# Patient Record
Sex: FEMALE | Race: OTHER | NOT HISPANIC OR LATINO
[De-identification: names, ages, dates, MRNs, and addresses within clinical notes are randomized per-mention and may not be internally consistent; named-entity substitution may affect disease eponyms.]

---

## 2019-07-08 ENCOUNTER — TRANSCRIPTION ENCOUNTER (OUTPATIENT)
Age: 24
End: 2019-07-08

## 2019-07-08 ENCOUNTER — APPOINTMENT (OUTPATIENT)
Dept: ANTEPARTUM | Facility: HOSPITAL | Age: 24
End: 2019-07-08
Payer: MEDICAID

## 2019-07-08 ENCOUNTER — INPATIENT (INPATIENT)
Facility: HOSPITAL | Age: 24
LOS: 3 days | Discharge: HOME | End: 2019-07-12
Attending: OBSTETRICS & GYNECOLOGY | Admitting: OBSTETRICS & GYNECOLOGY
Payer: MEDICAID

## 2019-07-08 ENCOUNTER — ASOB RESULT (OUTPATIENT)
Age: 24
End: 2019-07-08

## 2019-07-08 VITALS
DIASTOLIC BLOOD PRESSURE: 71 MMHG | TEMPERATURE: 99 F | SYSTOLIC BLOOD PRESSURE: 122 MMHG | OXYGEN SATURATION: 100 % | RESPIRATION RATE: 17 BRPM | HEART RATE: 88 BPM

## 2019-07-08 DIAGNOSIS — O26.899 OTHER SPECIFIED PREGNANCY RELATED CONDITIONS, UNSPECIFIED TRIMESTER: ICD-10-CM

## 2019-07-08 DIAGNOSIS — Z3A.00 WEEKS OF GESTATION OF PREGNANCY NOT SPECIFIED: ICD-10-CM

## 2019-07-08 DIAGNOSIS — O42.90 PREMATURE RUPTURE OF MEMBRANES, UNSPECIFIED AS TO LENGTH OF TIME BETWEEN RUPTURE AND ONSET OF LABOR, UNSPECIFIED WEEKS OF GESTATION: ICD-10-CM

## 2019-07-08 PROBLEM — Z00.00 ENCOUNTER FOR PREVENTIVE HEALTH EXAMINATION: Status: ACTIVE | Noted: 2019-07-08

## 2019-07-08 LAB
BASOPHILS # BLD AUTO: 0.06 K/UL — SIGNIFICANT CHANGE UP (ref 0–0.2)
BASOPHILS NFR BLD AUTO: 0.5 % — SIGNIFICANT CHANGE UP (ref 0–2)
BLD GP AB SCN SERPL QL: NEGATIVE — SIGNIFICANT CHANGE UP
EOSINOPHIL # BLD AUTO: 0.04 K/UL — SIGNIFICANT CHANGE UP (ref 0–0.5)
EOSINOPHIL NFR BLD AUTO: 0.3 % — SIGNIFICANT CHANGE UP (ref 0–6)
HCT VFR BLD CALC: 38.1 % — SIGNIFICANT CHANGE UP (ref 34.5–45)
HGB BLD-MCNC: 12 G/DL — SIGNIFICANT CHANGE UP (ref 11.5–15.5)
IMM GRANULOCYTES NFR BLD AUTO: 1.5 % — SIGNIFICANT CHANGE UP (ref 0–1.5)
LYMPHOCYTES # BLD AUTO: 1.44 K/UL — SIGNIFICANT CHANGE UP (ref 1–3.3)
LYMPHOCYTES # BLD AUTO: 12.5 % — LOW (ref 13–44)
MCHC RBC-ENTMCNC: 25.4 PG — LOW (ref 27–34)
MCHC RBC-ENTMCNC: 31.5 % — LOW (ref 32–36)
MCV RBC AUTO: 80.5 FL — SIGNIFICANT CHANGE UP (ref 80–100)
MONOCYTES # BLD AUTO: 0.55 K/UL — SIGNIFICANT CHANGE UP (ref 0–0.9)
MONOCYTES NFR BLD AUTO: 4.8 % — SIGNIFICANT CHANGE UP (ref 2–14)
NEUTROPHILS # BLD AUTO: 9.25 K/UL — HIGH (ref 1.8–7.4)
NEUTROPHILS NFR BLD AUTO: 80.4 % — HIGH (ref 43–77)
NRBC # FLD: 0 K/UL — SIGNIFICANT CHANGE UP (ref 0–0)
PLATELET # BLD AUTO: 229 K/UL — SIGNIFICANT CHANGE UP (ref 150–400)
PMV BLD: 10.8 FL — SIGNIFICANT CHANGE UP (ref 7–13)
RBC # BLD: 4.73 M/UL — SIGNIFICANT CHANGE UP (ref 3.8–5.2)
RBC # FLD: 14.8 % — HIGH (ref 10.3–14.5)
RH IG SCN BLD-IMP: POSITIVE — SIGNIFICANT CHANGE UP
RH IG SCN BLD-IMP: POSITIVE — SIGNIFICANT CHANGE UP
WBC # BLD: 11.51 K/UL — HIGH (ref 3.8–10.5)
WBC # FLD AUTO: 11.51 K/UL — HIGH (ref 3.8–10.5)

## 2019-07-08 PROCEDURE — 76819 FETAL BIOPHYS PROFIL W/O NST: CPT | Mod: 26

## 2019-07-08 PROCEDURE — 76805 OB US >/= 14 WKS SNGL FETUS: CPT | Mod: 26

## 2019-07-08 RX ORDER — BUTORPHANOL TARTRATE 2 MG/ML
2 INJECTION, SOLUTION INTRAMUSCULAR; INTRAVENOUS ONCE
Refills: 0 | Status: DISCONTINUED | OUTPATIENT
Start: 2019-07-08 | End: 2019-07-08

## 2019-07-08 RX ORDER — OXYTOCIN 10 UNIT/ML
333.33 VIAL (ML) INJECTION
Qty: 20 | Refills: 0 | Status: DISCONTINUED | OUTPATIENT
Start: 2019-07-08 | End: 2019-07-09

## 2019-07-08 RX ORDER — SODIUM CHLORIDE 9 MG/ML
1000 INJECTION, SOLUTION INTRAVENOUS
Refills: 0 | Status: DISCONTINUED | OUTPATIENT
Start: 2019-07-08 | End: 2019-07-09

## 2019-07-08 RX ORDER — OXYTOCIN 10 UNIT/ML
333.33 VIAL (ML) INJECTION
Qty: 20 | Refills: 0 | Status: DISCONTINUED | OUTPATIENT
Start: 2019-07-08 | End: 2019-07-08

## 2019-07-08 RX ORDER — SODIUM CHLORIDE 9 MG/ML
1000 INJECTION, SOLUTION INTRAVENOUS
Refills: 0 | Status: DISCONTINUED | OUTPATIENT
Start: 2019-07-08 | End: 2019-07-08

## 2019-07-08 RX ADMIN — BUTORPHANOL TARTRATE 2 MILLIGRAM(S): 2 INJECTION, SOLUTION INTRAMUSCULAR; INTRAVENOUS at 23:25

## 2019-07-08 RX ADMIN — BUTORPHANOL TARTRATE 2 MILLIGRAM(S): 2 INJECTION, SOLUTION INTRAMUSCULAR; INTRAVENOUS at 22:57

## 2019-07-08 NOTE — OB PROVIDER TRIAGE NOTE - HISTORY OF PRESENT ILLNESS
24 year old female   at 40weeks gestation who presents with LOF at 11am and mild contrxs and initially noted decreased fm but now feels more fm while in triage    deniied any ap issues denied any fetal issues      med hx denied   Surghx denied   NKDA   OB hx P0 SAB x1 24 year old female   at 40weeks gestation who presents with LOF at 11am and mild contrxs and initially noted decreased fm but now feels more fm while in triage    deniied any ap issues denied any fetal issues      med hx denied   Surghx denied   NKDA    OB hx P0 SAB x1

## 2019-07-08 NOTE — CHART NOTE - NSCHARTNOTEFT_GEN_A_CORE
Ob  note    Pt uncomfortable with contractions.      VE: 2.5/70/-3   moderate variability, +15x15 accels, -decels  [place] 3-6 mins      PROM continue PO cytotec  Discussed pain management pt desires IV pain meds at this time.  Stadol ordered.  Pt may have epidural when she desires.    Kasey Nur MD

## 2019-07-08 NOTE — OB PROVIDER TRIAGE NOTE - NSOBPROVIDERNOTE_OBGYN_ALL_OB_FT
24 year old female P0 at 40 wks gestation 24 year old female P0 at 40 wks gestation with SROM 11a for IOL with  PO Cytotec    GBS negative      case d/w Dr Ring 24 year old female P0 at 40 wks gestation with SROM 11a for IOL with  PO Cytotec    GBS unknown     case d/w Dr Ring

## 2019-07-08 NOTE — OB PROVIDER H&P - ASSESSMENT
24 year old female P0 at 40 wks who presented with SROM 11a   admitted for IOL with po Cytotec    GBS  negative     case d/w Dr Ring

## 2019-07-08 NOTE — OB RN TRIAGE NOTE - NS_VISITREASON1_OBGYN_ALL_OB
Reduced Fetal Movement/Rupture of Membranes Labor at Term/Reduced Fetal Movement/Rupture of Membranes

## 2019-07-08 NOTE — OB PROVIDER TRIAGE NOTE - NSHPPHYSICALEXAM_GEN_ALL_CORE
pt seen and examined    abd soft    SSE/VE        ABD SCAN  ATU pt seen and examined    abd soft    SSE/VE        ABD SCAN  ATU (sonographer)vtx kelli 8 bpp 8/8

## 2019-07-08 NOTE — OB PROVIDER H&P - HISTORY OF PRESENT ILLNESS
24 year old female   at 40weeks gestation who presents with LOF at 11am and mild contrxs and initially noted decreased fm but now feels more fm while in triage    deniied any ap issues denied any fetal issues      med hx denied   Surghx denied   NKDA    OB hx P0 SAB x1

## 2019-07-09 ENCOUNTER — RESULT REVIEW (OUTPATIENT)
Age: 24
End: 2019-07-09

## 2019-07-09 ENCOUNTER — TRANSCRIPTION ENCOUNTER (OUTPATIENT)
Age: 24
End: 2019-07-09

## 2019-07-09 LAB — T PALLIDUM AB TITR SER: NEGATIVE — SIGNIFICANT CHANGE UP

## 2019-07-09 PROCEDURE — 88307 TISSUE EXAM BY PATHOLOGIST: CPT | Mod: 26

## 2019-07-09 RX ORDER — DIPHENHYDRAMINE HCL 50 MG
25 CAPSULE ORAL EVERY 6 HOURS
Refills: 0 | Status: DISCONTINUED | OUTPATIENT
Start: 2019-07-09 | End: 2019-07-12

## 2019-07-09 RX ORDER — OXYCODONE HYDROCHLORIDE 5 MG/1
5 TABLET ORAL
Refills: 0 | Status: COMPLETED | OUTPATIENT
Start: 2019-07-09 | End: 2019-07-16

## 2019-07-09 RX ORDER — OXYTOCIN 10 UNIT/ML
333.33 VIAL (ML) INJECTION
Qty: 20 | Refills: 0 | Status: DISCONTINUED | OUTPATIENT
Start: 2019-07-09 | End: 2019-07-09

## 2019-07-09 RX ORDER — SODIUM CHLORIDE 9 MG/ML
1000 INJECTION, SOLUTION INTRAVENOUS
Refills: 0 | Status: DISCONTINUED | OUTPATIENT
Start: 2019-07-09 | End: 2019-07-09

## 2019-07-09 RX ORDER — LANOLIN
1 OINTMENT (GRAM) TOPICAL EVERY 6 HOURS
Refills: 0 | Status: DISCONTINUED | OUTPATIENT
Start: 2019-07-09 | End: 2019-07-12

## 2019-07-09 RX ORDER — CITRIC ACID/SODIUM CITRATE 300-500 MG
30 SOLUTION, ORAL ORAL ONCE
Refills: 0 | Status: DISCONTINUED | OUTPATIENT
Start: 2019-07-09 | End: 2019-07-09

## 2019-07-09 RX ORDER — TETANUS TOXOID, REDUCED DIPHTHERIA TOXOID AND ACELLULAR PERTUSSIS VACCINE, ADSORBED 5; 2.5; 8; 8; 2.5 [IU]/.5ML; [IU]/.5ML; UG/.5ML; UG/.5ML; UG/.5ML
0.5 SUSPENSION INTRAMUSCULAR ONCE
Refills: 0 | Status: DISCONTINUED | OUTPATIENT
Start: 2019-07-09 | End: 2019-07-12

## 2019-07-09 RX ORDER — GLYCERIN ADULT
1 SUPPOSITORY, RECTAL RECTAL AT BEDTIME
Refills: 0 | Status: DISCONTINUED | OUTPATIENT
Start: 2019-07-09 | End: 2019-07-12

## 2019-07-09 RX ORDER — NALOXONE HYDROCHLORIDE 4 MG/.1ML
0.1 SPRAY NASAL
Refills: 0 | Status: DISCONTINUED | OUTPATIENT
Start: 2019-07-09 | End: 2019-07-11

## 2019-07-09 RX ORDER — ONDANSETRON 8 MG/1
4 TABLET, FILM COATED ORAL EVERY 6 HOURS
Refills: 0 | Status: DISCONTINUED | OUTPATIENT
Start: 2019-07-09 | End: 2019-07-11

## 2019-07-09 RX ORDER — FAMOTIDINE 10 MG/ML
20 INJECTION INTRAVENOUS ONCE
Refills: 0 | Status: DISCONTINUED | OUTPATIENT
Start: 2019-07-09 | End: 2019-07-09

## 2019-07-09 RX ORDER — HEPARIN SODIUM 5000 [USP'U]/ML
5000 INJECTION INTRAVENOUS; SUBCUTANEOUS EVERY 12 HOURS
Refills: 0 | Status: DISCONTINUED | OUTPATIENT
Start: 2019-07-09 | End: 2019-07-12

## 2019-07-09 RX ORDER — SIMETHICONE 80 MG/1
80 TABLET, CHEWABLE ORAL EVERY 4 HOURS
Refills: 0 | Status: DISCONTINUED | OUTPATIENT
Start: 2019-07-09 | End: 2019-07-12

## 2019-07-09 RX ORDER — KETOROLAC TROMETHAMINE 30 MG/ML
30 SYRINGE (ML) INJECTION EVERY 6 HOURS
Refills: 0 | Status: DISCONTINUED | OUTPATIENT
Start: 2019-07-09 | End: 2019-07-11

## 2019-07-09 RX ORDER — IBUPROFEN 200 MG
600 TABLET ORAL EVERY 6 HOURS
Refills: 0 | Status: COMPLETED | OUTPATIENT
Start: 2019-07-09 | End: 2020-06-06

## 2019-07-09 RX ORDER — MAGNESIUM HYDROXIDE 400 MG/1
30 TABLET, CHEWABLE ORAL
Refills: 0 | Status: DISCONTINUED | OUTPATIENT
Start: 2019-07-09 | End: 2019-07-12

## 2019-07-09 RX ORDER — DOCUSATE SODIUM 100 MG
100 CAPSULE ORAL
Refills: 0 | Status: DISCONTINUED | OUTPATIENT
Start: 2019-07-09 | End: 2019-07-12

## 2019-07-09 RX ORDER — DEXAMETHASONE 0.5 MG/5ML
4 ELIXIR ORAL EVERY 6 HOURS
Refills: 0 | Status: DISCONTINUED | OUTPATIENT
Start: 2019-07-09 | End: 2019-07-11

## 2019-07-09 RX ORDER — OXYCODONE HYDROCHLORIDE 5 MG/1
5 TABLET ORAL ONCE
Refills: 0 | Status: DISCONTINUED | OUTPATIENT
Start: 2019-07-09 | End: 2019-07-12

## 2019-07-09 RX ORDER — ACETAMINOPHEN 500 MG
975 TABLET ORAL
Refills: 0 | Status: DISCONTINUED | OUTPATIENT
Start: 2019-07-09 | End: 2019-07-12

## 2019-07-09 RX ORDER — METOCLOPRAMIDE HCL 10 MG
10 TABLET ORAL ONCE
Refills: 0 | Status: DISCONTINUED | OUTPATIENT
Start: 2019-07-09 | End: 2019-07-09

## 2019-07-09 RX ORDER — ONDANSETRON 8 MG/1
4 TABLET, FILM COATED ORAL ONCE
Refills: 0 | Status: DISCONTINUED | OUTPATIENT
Start: 2019-07-09 | End: 2019-07-09

## 2019-07-09 RX ADMIN — SIMETHICONE 80 MILLIGRAM(S): 80 TABLET, CHEWABLE ORAL at 18:35

## 2019-07-09 RX ADMIN — Medication 30 MILLIGRAM(S): at 18:55

## 2019-07-09 RX ADMIN — SIMETHICONE 80 MILLIGRAM(S): 80 TABLET, CHEWABLE ORAL at 13:15

## 2019-07-09 RX ADMIN — Medication 30 MILLIGRAM(S): at 08:49

## 2019-07-09 RX ADMIN — Medication 975 MILLIGRAM(S): at 21:21

## 2019-07-09 RX ADMIN — SODIUM CHLORIDE 75 MILLILITER(S): 9 INJECTION, SOLUTION INTRAVENOUS at 08:06

## 2019-07-09 RX ADMIN — Medication 30 MILLIGRAM(S): at 18:35

## 2019-07-09 RX ADMIN — Medication 30 MILLIGRAM(S): at 08:05

## 2019-07-09 RX ADMIN — Medication 1000 MILLIUNIT(S)/MIN: at 08:06

## 2019-07-09 RX ADMIN — Medication 975 MILLIGRAM(S): at 13:15

## 2019-07-09 RX ADMIN — Medication 975 MILLIGRAM(S): at 13:50

## 2019-07-09 RX ADMIN — Medication 100 MILLIGRAM(S): at 13:15

## 2019-07-09 RX ADMIN — Medication 975 MILLIGRAM(S): at 21:35

## 2019-07-09 RX ADMIN — HEPARIN SODIUM 5000 UNIT(S): 5000 INJECTION INTRAVENOUS; SUBCUTANEOUS at 13:15

## 2019-07-09 NOTE — CHART NOTE - NSCHARTNOTEFT_GEN_A_CORE
R1 OB Progress Note    Patient seen and evaluated at bedside.  Endorses increasing discomfort with contractions.     T(C): 36.7 (19 @ 22:36), Max: 37.3 (19 @ 13:15)  HR: 88 (19 @ 00:59) (62 - 110)  BP: 100/61 (19 @ 22:36) (100/59 - 122/71)  RR: 18 (19 @ 22:30) (17 - 18)  SpO2: 100% (19 @ 00:59) (96% - 100%)    SVE: 2/70/-3    EFM: 120, moderate variability, + accels, + intermittent late decels, no recurrent decels   [place]: cont q2-3 min    A/P 24y  at 40w admitted for IOL 2/2 PROM     -Labor: continue PO cytotec.  -Fetus: Cat II tracing. Continue resuscitation with positioning and supplementary O2.   -GBS negative  -Analgesia: Discussed pain management, patient is not yet interested in an epidural although per Dr. Nur is able to have epidural when she wishes.     Iliana Tang, PGY-1  d/w Dr. Nur

## 2019-07-09 NOTE — CHART NOTE - NSCHARTNOTEFT_GEN_A_CORE
Ob  note    Went to evaluate pt for decelerations.  Pt comfortable after epidural.   VE 5/90/-3  AROM clear     moderate variability, -accels, recurrent late decels  [place] ctxs q 2-4 mins      Despite interventions, IVF bolus, O2 administration, repositioning, FHT with recurrent decels at 5 cm.  Showed and reviewed fetal heart tracing to  and patient, expressed concern due to recurrent decelerations.  Recommended PLTCS at this time.  Discussed risks and benefits of c/s with pt.  Stepped out of the room to give family time to discuss.    Kasey Nur MD

## 2019-07-09 NOTE — OB NEONATOLOGY/PEDIATRICIAN DELIVERY SUMMARY - NSPEDSNEONOTESA_OBGYN_ALL_OB_FT
Baby is a 40.1 week GA male born to a 23 y/o G 2  mother via c/s for cat 2 tracing. No significant Maternal or Pregnancy hx. Maternal blood type A+. Prenatal labs neg/NR/imm. GBS neg on 6/15. ROM 18hrs with clear fluid. Baby born vigorous and crying spontaneously. Warmed, dried, stimulated. Apgars 9 / 9. EOS 0.11. Mom desires hep B, circ, breast feeding.

## 2019-07-09 NOTE — CHART NOTE - NSCHARTNOTEFT_GEN_A_CORE
R3 labor Note    Pt evaluated for low baseline and recurrent decels    SVE: 3.4/80/-2  EFM: 110/mod hayden/+accel/recurrent late decels  [place]: q2-3 min    Vital Signs Last 24 Hrs  T(C): 36.7 (09 Jul 2019 01:53), Max: 37.3 (08 Jul 2019 13:15)  T(F): 98.1 (09 Jul 2019 01:53), Max: 99.1 (08 Jul 2019 13:15)  HR: 103 (09 Jul 2019 02:39) (62 - 122)  BP: 128/74 (09 Jul 2019 01:53) (100/59 - 128/74)  BP(mean): --  RR: 18 (09 Jul 2019 01:53) (17 - 18)  SpO2: 99% (09 Jul 2019 02:39) (96% - 100%)    I&O's Detail    08 Jul 2019 07:01  -  09 Jul 2019 02:52  --------------------------------------------------------  IN:    lactated ringers.: 375 mL  Total IN: 375 mL    OUT:  Total OUT: 0 mL    Total NET: 375 mL                                12.0   11.51 )-----------( 229      ( 08 Jul 2019 16:08 )             38.1       Plan  Cat 2 tracing resuscitate with lateral tilt O2 IVF  Transfer to LDR. Continue IOL with pitocin  Pt will consider epidural    Pt seen and d/w Dr. Boom Delarosa PGY3

## 2019-07-09 NOTE — BRIEF OPERATIVE NOTE - NSICDXBRIEFPOSTOP_GEN_ALL_CORE_FT
POST-OP DIAGNOSIS:  Non-reassuring electronic fetal monitoring tracing 09-Jul-2019 07:26:11  Isa España

## 2019-07-09 NOTE — DISCHARGE NOTE OB - MEDICATION SUMMARY - MEDICATIONS TO TAKE
I will START or STAY ON the medications listed below when I get home from the hospital:    acetaminophen 325 mg oral tablet  -- 3 tab(s) by mouth   -- Indication: For Pain, as needed    ibuprofen 600 mg oral tablet  -- 1 tab(s) by mouth every 6 hours  -- Indication: For Pain, as needed

## 2019-07-09 NOTE — OB PROVIDER DELIVERY SUMMARY - NSPROVIDERDELIVERYNOTE_OBGYN_ALL_OB_FT
Uncomplicated pLTCS for NRFHT remote from delivery     Viable infant Uncomplicated pLTCS for NRFHT remote from delivery     Viable infant    Kasey Nur MD

## 2019-07-09 NOTE — DISCHARGE NOTE OB - PATIENT PORTAL LINK FT
You can access the [facility] Portal, offered by [facility], by registering with the following website: [email or web address]

## 2019-07-09 NOTE — BRIEF OPERATIVE NOTE - NSICDXBRIEFPREOP_GEN_ALL_CORE_FT
PRE-OP DIAGNOSIS:  Non-reassuring electronic fetal monitoring tracing 09-Jul-2019 07:25:47  Isa España

## 2019-07-09 NOTE — DISCHARGE NOTE OB - CARE PROVIDER_API CALL
Pati Beltran)  Obstetrics and Gynecology  [address], [address]  [place] [address]  Phone: [phone]  Fax: [phone]  Follow Up Time:

## 2019-07-10 LAB
BASOPHILS # BLD AUTO: 0.05 K/UL — SIGNIFICANT CHANGE UP (ref 0–0.2)
BASOPHILS NFR BLD AUTO: 0.3 % — SIGNIFICANT CHANGE UP (ref 0–2)
EOSINOPHIL # BLD AUTO: 0.03 K/UL — SIGNIFICANT CHANGE UP (ref 0–0.5)
EOSINOPHIL NFR BLD AUTO: 0.2 % — SIGNIFICANT CHANGE UP (ref 0–6)
HCT VFR BLD CALC: 31.9 % — LOW (ref 34.5–45)
HGB BLD-MCNC: 9.7 G/DL — LOW (ref 11.5–15.5)
IMM GRANULOCYTES NFR BLD AUTO: 1.1 % — SIGNIFICANT CHANGE UP (ref 0–1.5)
LYMPHOCYTES # BLD AUTO: 15.9 % — SIGNIFICANT CHANGE UP (ref 13–44)
LYMPHOCYTES # BLD AUTO: 2.53 K/UL — SIGNIFICANT CHANGE UP (ref 1–3.3)
MCHC RBC-ENTMCNC: 24.9 PG — LOW (ref 27–34)
MCHC RBC-ENTMCNC: 30.4 % — LOW (ref 32–36)
MCV RBC AUTO: 82 FL — SIGNIFICANT CHANGE UP (ref 80–100)
MONOCYTES # BLD AUTO: 1.21 K/UL — HIGH (ref 0–0.9)
MONOCYTES NFR BLD AUTO: 7.6 % — SIGNIFICANT CHANGE UP (ref 2–14)
NEUTROPHILS # BLD AUTO: 11.96 K/UL — HIGH (ref 1.8–7.4)
NEUTROPHILS NFR BLD AUTO: 74.9 % — SIGNIFICANT CHANGE UP (ref 43–77)
NRBC # FLD: 0 K/UL — SIGNIFICANT CHANGE UP (ref 0–0)
PLATELET # BLD AUTO: 203 K/UL — SIGNIFICANT CHANGE UP (ref 150–400)
PMV BLD: 11.1 FL — SIGNIFICANT CHANGE UP (ref 7–13)
RBC # BLD: 3.89 M/UL — SIGNIFICANT CHANGE UP (ref 3.8–5.2)
RBC # FLD: 15 % — HIGH (ref 10.3–14.5)
WBC # BLD: 15.96 K/UL — HIGH (ref 3.8–10.5)
WBC # FLD AUTO: 15.96 K/UL — HIGH (ref 3.8–10.5)

## 2019-07-10 RX ORDER — OXYCODONE HYDROCHLORIDE 5 MG/1
5 TABLET ORAL ONCE
Refills: 0 | Status: DISCONTINUED | OUTPATIENT
Start: 2019-07-10 | End: 2019-07-12

## 2019-07-10 RX ORDER — FERROUS SULFATE 325(65) MG
325 TABLET ORAL DAILY
Refills: 0 | Status: DISCONTINUED | OUTPATIENT
Start: 2019-07-10 | End: 2019-07-12

## 2019-07-10 RX ORDER — IBUPROFEN 200 MG
600 TABLET ORAL EVERY 6 HOURS
Refills: 0 | Status: DISCONTINUED | OUTPATIENT
Start: 2019-07-10 | End: 2019-07-12

## 2019-07-10 RX ORDER — OXYCODONE HYDROCHLORIDE 5 MG/1
5 TABLET ORAL
Refills: 0 | Status: DISCONTINUED | OUTPATIENT
Start: 2019-07-10 | End: 2019-07-12

## 2019-07-10 RX ADMIN — Medication 975 MILLIGRAM(S): at 12:10

## 2019-07-10 RX ADMIN — Medication 975 MILLIGRAM(S): at 11:40

## 2019-07-10 RX ADMIN — Medication 600 MILLIGRAM(S): at 16:30

## 2019-07-10 RX ADMIN — Medication 600 MILLIGRAM(S): at 23:06

## 2019-07-10 RX ADMIN — Medication 600 MILLIGRAM(S): at 17:00

## 2019-07-10 RX ADMIN — Medication 975 MILLIGRAM(S): at 18:55

## 2019-07-10 RX ADMIN — Medication 30 MILLIGRAM(S): at 00:22

## 2019-07-10 RX ADMIN — SIMETHICONE 80 MILLIGRAM(S): 80 TABLET, CHEWABLE ORAL at 11:40

## 2019-07-10 RX ADMIN — SIMETHICONE 80 MILLIGRAM(S): 80 TABLET, CHEWABLE ORAL at 18:21

## 2019-07-10 RX ADMIN — HEPARIN SODIUM 5000 UNIT(S): 5000 INJECTION INTRAVENOUS; SUBCUTANEOUS at 00:21

## 2019-07-10 RX ADMIN — Medication 600 MILLIGRAM(S): at 23:35

## 2019-07-10 RX ADMIN — Medication 325 MILLIGRAM(S): at 16:25

## 2019-07-10 RX ADMIN — Medication 975 MILLIGRAM(S): at 18:21

## 2019-07-10 RX ADMIN — HEPARIN SODIUM 5000 UNIT(S): 5000 INJECTION INTRAVENOUS; SUBCUTANEOUS at 14:20

## 2019-07-10 RX ADMIN — Medication 100 MILLIGRAM(S): at 11:40

## 2019-07-10 RX ADMIN — Medication 30 MILLIGRAM(S): at 00:46

## 2019-07-10 RX ADMIN — Medication 30 MILLIGRAM(S): at 06:26

## 2019-07-10 RX ADMIN — Medication 30 MILLIGRAM(S): at 07:06

## 2019-07-10 RX ADMIN — Medication 1 TABLET(S): at 16:25

## 2019-07-10 NOTE — PROVIDER CONTACT NOTE (OTHER) - ASSESSMENT
c/o headache 6/10, no blurry vision or dizziness. Verbalized she gets headache when she sits down and feels fine when she's laying down. Lochia light, fundus at umbilicus. No s/sx of infection, bleeding or drainage from the incision site.

## 2019-07-10 NOTE — PROGRESS NOTE ADULT - PROBLEM SELECTOR PLAN 1
- Continue regular diet.  - Increase ambulation.  - Continue motrin, tylenol, oxycodone PRN for pain control.  - F/u AM CBC  -Milacron for gas Robyn Frankel PGY-1 - Continue regular diet.  - Increase ambulation.  - Continue motrin, tylenol, oxycodone PRN for pain control.  - F/u AM CBC  -Mylicon for gas Robyn Frankel PGY-1

## 2019-07-10 NOTE — PROGRESS NOTE ADULT - SUBJECTIVE AND OBJECTIVE BOX
POST OP DAY  1  s/p   SECTION    SUBJECTIVE:  I'm ok.    PAIN SCALE SCORE: [x] Refer to charted pain scores    THERAPY:  [  ] Spinal morphine   [ x ] Epidural morphine   [  ] IV PCA Hydromorphone 1 mg/ml    OBJECTIVE:  Comfortable Appearing    SEDATION SCORE:	  [ x ] Alert	    [  ] Drowsy        [  ] Arousable	[  ] Asleep	[  ] Unresponsive    Side Effects:	  [ x ] None	     [  ] Nausea        [  ] Pruritus        [  ] Weakness   [  ] Numbness        ASSESSMENT/ PLAN   [   ] Discontinue         [  ] Continue    [ x ] Change to prn Analgesics as per primary service.    DOCUMENTATION & VERIFICATION OF CURRENT MEDS [ x ] Done    COMMENTS: Patient complains of headache focused on the top of her head.  Headache is non-positional, started <24 hours after epidural placement.  No visual or auditory symptoms.  She was seen walking around the room.  Advised, PO hydration, moderate caffeine intake and po analgesics PRN.  Will follow.

## 2019-07-10 NOTE — PROVIDER CONTACT NOTE (OTHER) - BACKGROUND
Skyla.  on 19 at 05:22 to a baby boy, 40.1 weeks gestation, para 1011, ebl=700 No med. surg. history.

## 2019-07-10 NOTE — CHART NOTE - NSCHARTNOTEFT_GEN_A_CORE
Patient assessed at 1645 due to reporting persistent headache after receiving acetaminophen for pain this am. patient states headache started this am, denies any blur vision, dizziness, shortness of breath, difficulties breathing and/or any history of headaches. patient reports frontal headache with slight neck stiffness. Patient reports relief of headache when laying flat. Patient also reports gas discomfort in shoulder. Patient states she has been passing flatus and states she has been using incentive spirometer. patient reports that she there was a few attempts of spinal epidural when placed at delivery.     Vitals Signs  Vital Signs Last 24 Hrs  T(C): 36.7 (10 Jul 2019 14:54), Max: 36.8 (10 Jul 2019 11:35)  T(F): 98 (10 Jul 2019 14:54), Max: 98.2 (10 Jul 2019 11:35)  HR: 73 (10 Jul 2019 14:54) (73 - 82)  BP: 96/74 (10 Jul 2019 14:54) (95/54 - 111/66)  BP(mean): --  RR: 17 (10 Jul 2019 14:54) (17 - 18)  SpO2: 99% (10 Jul 2019 14:54) (99% - 100%)    Labs                        9.7    15.96 )-----------( 203      ( 10 Jul 2019 06:20 )             31.9       Assessment  25y/o  1day @ 0522 postpartum primary  section for abnormal fetal status. History of SABx1. EBL 700cc. Alert and orientedx3. No distress noted. Lung sounds clear bilaterally. Abdomen soft, nontender, slight distended tympanic abdomen. Abdominal incision clean, dry and intact with steri strips. Abdominal binder in place. Lochia light rubra. Slight edema to lower extremities equal bilaterally nontender with no erythema noted and positive pedal pulses.     Plan:  Anesthesia consulted to rule out possible spinal headache.   Patient instructed to lay flat when at rest, increase hydration, use of pain medication and caffeine to assist in relief of spinal headache.   Will continue to monitor.

## 2019-07-10 NOTE — PROGRESS NOTE ADULT - ASSESSMENT
A/P: 25yo POD#1 s/p LTCS.  Patient is stable and doing well post-operatively.  VS are stable and wnl. Chest pain likely 2/2 to decreased mobility and gas.

## 2019-07-10 NOTE — PROGRESS NOTE ADULT - SUBJECTIVE AND OBJECTIVE BOX
OB Progress Note:  Delivery, POD#1    S: 25yo POD#1 s/p LTCS . C/o mild chest pain and headache this AM.  She is tolerating a regular diet has not yet passed flatus. Denies N/V. Denies CP/SOB/lightheadedness/dizziness.   She is ambulating without difficulty.   Voiding spontaneously  Denies heavy vaginal bleeding.    O:   Vital Signs Last 24 Hrs  T(C): 36.7 (10 Jul 2019 06:15), Max: 37.6 (2019 08:15)  T(F): 98.1 (10 Jul 2019 06:15), Max: 99.7 (2019 08:15)  HR: 82 (10 Jul 2019 06:15) (78 - 94)  BP: 111/66 (10 Jul 2019 06:15) (95/54 - 128/67)  BP(mean): 79 (2019 08:15) (79 - 79)  RR: 18 (10 Jul 2019 06:15) (18 - 20)  SpO2: 100% (10 Jul 2019 06:15) (98% - 100%)    Labs:  Blood type: A Positive  Rubella IgG: RPR: Negative                          9.7<L>   15.96<H> >-----------< 203    ( 07-10 @ 06:20 )             31.9<L>                        12.0   11.51<H> >-----------< 229    (  @ 16:08 )             38.1                  PE:  General: NAD  CV: RRR   Chest: CTABL  Abdomen: Mildly distended, appropriately tender, Fundus firm, incision c/d/i.  VE: No heavy vaginal bleeding  Extremities: No erythema, no pitting edema

## 2019-07-10 NOTE — PROGRESS NOTE ADULT - SUBJECTIVE AND OBJECTIVE BOX
ANESTHESIA POSTOP CHECK    24y Female POSTOP DAY 1     Patient had questionable wet tap.  Evaluated for PDPH.  Patient seen walking around the room complaining of a headache on the top of her head.  Headache started less than 24 hours post epidural, is non positional and has no associated bulbar symptoms.  PO hydration, caffeine intake, and analgesic use has been encouraged.  Will re-evaluate for headache on 7/11/19 as well.    Please call with any concerns x [phone]

## 2019-07-10 NOTE — PROGRESS NOTE ADULT - SUBJECTIVE AND OBJECTIVE BOX
Called by NP to evaluate patient for post dural puncture headache.  Patient with history of questionable dural puncture as per verbal report from Dr. Garcia and from chart.  Patients symptoms remain unchanged from this morning except patient now reports relief from lying flat.  She has not received tylenol this afternoon.  Conservative measures this morning have been taken into consideration.  Additional medication offered to relieve headache oxycodone and neurontin offered but at this time the patient has refused any additional medication.  A blood patch was briefly discussed but as this patient has, if it is indeed a post dural puncture headache it is mild at this time as the patient is able to sit upright on a couch during the entirety of this evaluation.  OB Anesthesia night team is aware of this patient and we will follow up again tomorrow.

## 2019-07-11 RX ORDER — IBUPROFEN 200 MG
1 TABLET ORAL
Qty: 0 | Refills: 0 | DISCHARGE
Start: 2019-07-11

## 2019-07-11 RX ORDER — CAFFEINE 200 MG
200 TABLET ORAL EVERY 6 HOURS
Refills: 0 | Status: DISCONTINUED | OUTPATIENT
Start: 2019-07-11 | End: 2019-07-11

## 2019-07-11 RX ORDER — GABAPENTIN 400 MG/1
100 CAPSULE ORAL THREE TIMES A DAY
Refills: 0 | Status: DISCONTINUED | OUTPATIENT
Start: 2019-07-11 | End: 2019-07-12

## 2019-07-11 RX ORDER — ACETAMINOPHEN 500 MG
3 TABLET ORAL
Qty: 0 | Refills: 0 | DISCHARGE
Start: 2019-07-11

## 2019-07-11 RX ADMIN — OXYCODONE HYDROCHLORIDE 5 MILLIGRAM(S): 5 TABLET ORAL at 11:30

## 2019-07-11 RX ADMIN — Medication 600 MILLIGRAM(S): at 12:36

## 2019-07-11 RX ADMIN — Medication 975 MILLIGRAM(S): at 22:50

## 2019-07-11 RX ADMIN — SIMETHICONE 80 MILLIGRAM(S): 80 TABLET, CHEWABLE ORAL at 05:29

## 2019-07-11 RX ADMIN — Medication 975 MILLIGRAM(S): at 22:20

## 2019-07-11 RX ADMIN — GABAPENTIN 100 MILLIGRAM(S): 400 CAPSULE ORAL at 22:21

## 2019-07-11 RX ADMIN — Medication 100 MILLIGRAM(S): at 05:29

## 2019-07-11 RX ADMIN — Medication 600 MILLIGRAM(S): at 17:52

## 2019-07-11 RX ADMIN — Medication 975 MILLIGRAM(S): at 09:10

## 2019-07-11 RX ADMIN — OXYCODONE HYDROCHLORIDE 5 MILLIGRAM(S): 5 TABLET ORAL at 03:00

## 2019-07-11 RX ADMIN — Medication 975 MILLIGRAM(S): at 10:00

## 2019-07-11 RX ADMIN — HEPARIN SODIUM 5000 UNIT(S): 5000 INJECTION INTRAVENOUS; SUBCUTANEOUS at 14:08

## 2019-07-11 RX ADMIN — OXYCODONE HYDROCHLORIDE 5 MILLIGRAM(S): 5 TABLET ORAL at 02:33

## 2019-07-11 RX ADMIN — Medication 975 MILLIGRAM(S): at 03:00

## 2019-07-11 RX ADMIN — Medication 600 MILLIGRAM(S): at 13:35

## 2019-07-11 RX ADMIN — OXYCODONE HYDROCHLORIDE 5 MILLIGRAM(S): 5 TABLET ORAL at 10:33

## 2019-07-11 RX ADMIN — Medication 1 TABLET(S): at 12:36

## 2019-07-11 RX ADMIN — Medication 100 MILLIGRAM(S): at 17:52

## 2019-07-11 RX ADMIN — GABAPENTIN 100 MILLIGRAM(S): 400 CAPSULE ORAL at 14:08

## 2019-07-11 RX ADMIN — Medication 975 MILLIGRAM(S): at 02:33

## 2019-07-11 RX ADMIN — Medication 600 MILLIGRAM(S): at 06:00

## 2019-07-11 RX ADMIN — Medication 600 MILLIGRAM(S): at 05:29

## 2019-07-11 RX ADMIN — HEPARIN SODIUM 5000 UNIT(S): 5000 INJECTION INTRAVENOUS; SUBCUTANEOUS at 02:33

## 2019-07-11 RX ADMIN — Medication 600 MILLIGRAM(S): at 18:52

## 2019-07-11 NOTE — PROGRESS NOTE ADULT - SUBJECTIVE AND OBJECTIVE BOX
SUBJECTIVE:    Pain: well controlled  Complaints:none    MILESTONES:    Alert and oriented x 3  [x  ]  Out of bed/ ambulating. [x  ]  Flatus: [x  ]  Postive [  ] Negative   Bowel movement  [  ] Positive [ x ] Negative   Voiding [x  ] Due to void [  ]   Diet: Regular [ x ]  Clears [  ]  NPO [  ]  Infant feeding:  Breast [ x ]   Bottle [  ]  Both [  ]  Feeding related inssues and/or concerns:none      OBJECTIVE:  T(C): 36.7 (19 @ 10:35), Max: 37.2 (07-10-19 @ 22:11)  HR: 88 (19 @ 10:35) (67 - 91)  BP: 100/69 (19 @ 10:35) (96/74 - 118/63)  RR: 20 (19 @ 10:35) (17 - 20)  SpO2: 99% (19 @ 10:35) (99% - 100%)  Wt(kg): --                        9.7    15.96 )-----------( 203      ( 10 Jul 2019 06:20 )             31.9     MEDICATIONS  (STANDING):  acetaminophen   Tablet .. 975 milliGRAM(s) Oral <User Schedule>  diphtheria/tetanus/pertussis (acellular) Vaccine (ADAcel) 0.5 milliLiter(s) IntraMuscular once  ferrous    sulfate 325 milliGRAM(s) Oral daily  gabapentin 100 milliGRAM(s) Oral three times a day  heparin  Injectable 5000 Unit(s) SubCutaneous every 12 hours  ibuprofen  Tablet. 600 milliGRAM(s) Oral every 6 hours  prenatal multivitamin 1 Tablet(s) Oral daily    MEDICATIONS  (PRN):  dexamethasone  Injectable 4 milliGRAM(s) IV Push every 6 hours PRN Nausea  diphenhydrAMINE 25 milliGRAM(s) Oral every 6 hours PRN Itching  docusate sodium 100 milliGRAM(s) Oral two times a day PRN Stool softening  glycerin Suppository - Adult 1 Suppository(s) Rectal at bedtime PRN Constipation  lanolin Ointment 1 Application(s) Topical every 6 hours PRN Sore Nipples  magnesium hydroxide Suspension 30 milliLiter(s) Oral two times a day PRN Constipation  naloxone Injectable 0.1 milliGRAM(s) IV Push every 3 minutes PRN For ANY of the following changes in patient status:  A. Breaths Per Minute LESS THAN 10, B. Oxygen saturation LESS THAN 90%, C. Sedation score of 6 for Stop After: 4 Times  ondansetron Injectable 4 milliGRAM(s) IV Push every 6 hours PRN Nausea  oxyCODONE    IR 5 milliGRAM(s) Oral once PRN Moderate to Severe Pain (4-10)  oxyCODONE    IR 5 milliGRAM(s) Oral every 3 hours PRN Moderate to Severe Pain (4-10)  oxyCODONE    IR 5 milliGRAM(s) Oral once PRN Moderate to Severe Pain (4-10)  simethicone 80 milliGRAM(s) Chew every 4 hours PRN Gas    ASSESSMENT:  24y  y/o G 2  P   1 PO Day#  2      Delivery: Primary [  x]    Repeat [  ]                                       Indication of procedure: abnormal fetal status  Condition: Stable  Past Medical History significant for: HPI:  24 year old female   at 40weeks gestation who presents with LOF at 11am and mild contrxs and initially noted decreased fm but now feels more fm while in triage    deniied any ap issues denied any fetal issues      med hx denied   Surghx denied   NKDA    OB hx P0 SAB x1 (2019 15:19)    Current Issues: spinal HA  Heart:         RRR                     Lungs: clear  Breasts:  Soft [x  ]   Engorged [  ]  Abdomen: Soft [x  ] , distended [  ] nontender [  ]   Bowel sounds :  Present [x  ]  Absent [  ]   Fundus firm [x  ]  Boggy [  ]  Abdominal incision: Clean, dry and intact [x  ]  Staples [  ] Steri Strips [ x ] Dermabond [  ] Sutures [  ]  Patient wearing abdominal binder for support.  Vaginal: Lochia:  Heavy [  ]  Moderate [  ]   Scant [ x ]  Extremities: Edema [0  ] negative Kimberly's Sign [ x ] Nontender Jose  [ x ] Positive pedal pulses [ x ]  Other relevant physical exam findings: stiff neck and spinal HA feel better on supine position      PLAN:  Plan: Increase ambulation, analgesia PRN and pain medication protocol standing oxycodone, ibuprofen and acetaminophen.  Diet: Regular diet  seen by Anesthesia on Neurontin  Continue routine post-operative and postpartum care.     Discharge Planning [  ]  Consults:   Social Work [  ] Lac[place] [  ] Other [  ]

## 2019-07-11 NOTE — PROGRESS NOTE ADULT - SUBJECTIVE AND OBJECTIVE BOX
Patient seen today in follow up for evaluation of post dural puncture headache.  Vital signs reviewed.  Over night Mrs. Merlos took oxycodone (in addition to tylenol and motrin) with improvement of symptoms.  She has been orally hydrating and having moderate amounts of caffeine.  Compared to yesterday, her headache pain has decreased from a 7/10 to a 4/10 without bulbar symptoms however it still has some positional component to it (improves to pain score of 0/10 when completely supine.) She is able to walk and take care of herself and her baby.  I have discussed continuation of conservative therapy (NSAIDs, oral hydration and moderate caffeine intake) with her and the addition of neurontin 100 mg tid.  This plan has been discussed with her RN and the OB chief resident.  Epidrual blood patch although a treatment option for epidural blood patch would not be recommended at this time.  Will re-evaluate 7/12/19.  Please call if questions x [phone] OB Anesthesia.

## 2019-07-12 VITALS
SYSTOLIC BLOOD PRESSURE: 107 MMHG | OXYGEN SATURATION: 100 % | RESPIRATION RATE: 17 BRPM | HEART RATE: 68 BPM | DIASTOLIC BLOOD PRESSURE: 65 MMHG | TEMPERATURE: 98 F

## 2019-07-12 RX ADMIN — SIMETHICONE 80 MILLIGRAM(S): 80 TABLET, CHEWABLE ORAL at 05:15

## 2019-07-12 RX ADMIN — GABAPENTIN 100 MILLIGRAM(S): 400 CAPSULE ORAL at 05:15

## 2019-07-12 RX ADMIN — HEPARIN SODIUM 5000 UNIT(S): 5000 INJECTION INTRAVENOUS; SUBCUTANEOUS at 05:15

## 2019-07-12 RX ADMIN — Medication 600 MILLIGRAM(S): at 13:18

## 2019-07-12 RX ADMIN — GABAPENTIN 100 MILLIGRAM(S): 400 CAPSULE ORAL at 15:59

## 2019-07-12 RX ADMIN — Medication 975 MILLIGRAM(S): at 16:00

## 2019-07-12 RX ADMIN — Medication 600 MILLIGRAM(S): at 12:48

## 2019-07-12 RX ADMIN — Medication 100 MILLIGRAM(S): at 05:15

## 2019-07-12 RX ADMIN — Medication 600 MILLIGRAM(S): at 05:16

## 2019-07-12 RX ADMIN — Medication 600 MILLIGRAM(S): at 05:45

## 2019-07-12 NOTE — PROGRESS NOTE ADULT - SUBJECTIVE AND OBJECTIVE BOX
SUBJECTIVE:    Pain: Controlled    Complaints: C/O Headache    MILESTONES:    Alert and Oriented x 3  [ x ]  Out of bed/ ambulating. [ x ]  Flatus:   Positive [ x ]  Negative [  ]  Bowel movement  [  ] Positive [  ] Negative   Voiding [x  ] Due to void [  ]   [place]/Indwelling catheter in place [  ]  Diet: Regular [ x ]  Clears [  ]  NPO [  ]    Infant feeding:  Breast [  ]   Bottle [  ]  Both [X  ]  Feeding related issues and/or concerns:      OBJECTIVE:  T(C): 36.8 (19 @ 06:00), Max: 36.8 (19 @ 21:41)  HR: 65 (19 @ 06:00) (65 - 88)  BP: 120/67 (19 @ 06:00) (100/69 - 120/67)  RR: 16 (19 @ 06:00) (16 - 20)  SpO2: 100% (19 @ 06:00) (99% - 100%)  Wt(kg): --          Blood Type: A Positive    RPR: Negative          MEDICATIONS  (STANDING):  acetaminophen   Tablet .. 975 milliGRAM(s) Oral <User Schedule>  diphtheria/tetanus/pertussis (acellular) Vaccine (ADAcel) 0.5 milliLiter(s) IntraMuscular once  ferrous    sulfate 325 milliGRAM(s) Oral daily  gabapentin 100 milliGRAM(s) Oral three times a day  heparin  Injectable 5000 Unit(s) SubCutaneous every 12 hours  ibuprofen  Tablet. 600 milliGRAM(s) Oral every 6 hours  prenatal multivitamin 1 Tablet(s) Oral daily    MEDICATIONS  (PRN):  diphenhydrAMINE 25 milliGRAM(s) Oral every 6 hours PRN Itching  docusate sodium 100 milliGRAM(s) Oral two times a day PRN Stool softening  glycerin Suppository - Adult 1 Suppository(s) Rectal at bedtime PRN Constipation  lanolin Ointment 1 Application(s) Topical every 6 hours PRN Sore Nipples  magnesium hydroxide Suspension 30 milliLiter(s) Oral two times a day PRN Constipation  oxyCODONE    IR 5 milliGRAM(s) Oral once PRN Moderate to Severe Pain (4-10)  oxyCODONE    IR 5 milliGRAM(s) Oral every 3 hours PRN Moderate to Severe Pain (4-10)  oxyCODONE    IR 5 milliGRAM(s) Oral once PRN Moderate to Severe Pain (4-10)  simethicone 80 milliGRAM(s) Chew every 4 hours PRN Gas        ASSESSMENT:    24y     G  2    P   1011      PO Day#  3        Delivery: Primary [ X ]    Repeat [  ]     EBL - 700                                    Indication of procedure: Abnormal Fetal Status    Condition: Stable    Past Medical History significant for: HPI:  24 year old female   at 40weeks gestation who presents with LOF at 11am and mild contrxs and initially noted decreased fm but now feels more fm while in triage    deniied any ap issues denied any fetal issues      med hx denied   Surghx denied   NKDA    OB hx P0 SAB x1 (2019 15:19)      Current Issues:    Breasts:  Soft [x  ]   Engorged [  ]  Nipples:  Abdomen: Soft [ x ]   Distended [  ] Nontender [  ]     Bowel sounds :  Present [  ]  Absent [  ]   Fundus:  Firm [x  ]  Boggy [  ]    Abdominal incision: Clean, Dry and Intact [x  ]  Staples [  ] Steri Strips [ X ] Dermabond [  ] Sutures [  ]    Patient wearing abdominal binder for support.    Vaginal: Lochia:  Heavy [  ]  Moderate [ x ]   Scant [  ]    Extremities: Edema [ X ] Negative Kimberly's Sign [X  ] Nontender Jose  [ x ] Positive pedal pulses [  ]    Other relevant physical exam findings: Hx. Spinal Headache --> Neurontin. Sitting up in bed, in no distress.      PLAN:    Plan: Increase ambulation, analgesia PRN and pain medication protocol standing oxycodone, ibuprofen and acetaminophen.    Diet: Regular diet    Continue routine post-operative and postpartum care.  Patient states that she still has a headache, but it feels a bit better since having breakfast.     Discharge Planning [ x ]    For discharge Today  [ X   ]    Consults:  Social Work [  ]  Lactation [ x ]  Other [   Anesthesia   ]

## 2021-03-04 NOTE — OB RN DELIVERY SUMMARY - NS_NUCHALCORD_OBGYN_ALL_OB
Patient here for PN 1  Pap and GC/CH collected today  PN labs done  Nuchal scheduled for 3/10  She had her flu vaccine  Blue packet given to patient  She denies cramping or spotting  Urine neg for protein and glucose  None

## 2021-05-03 ENCOUNTER — INPATIENT (INPATIENT)
Facility: HOSPITAL | Age: 26
LOS: 1 days | Discharge: HOME | End: 2021-05-05
Attending: OBSTETRICS & GYNECOLOGY | Admitting: OBSTETRICS & GYNECOLOGY

## 2021-05-03 ENCOUNTER — OUTPATIENT (OUTPATIENT)
Dept: INPATIENT UNIT | Facility: HOSPITAL | Age: 26
LOS: 1 days | Discharge: HOME | End: 2021-05-03
Payer: MEDICAID

## 2021-05-03 ENCOUNTER — APPOINTMENT (OUTPATIENT)
Dept: ANTEPARTUM | Facility: CLINIC | Age: 26
End: 2021-05-03

## 2021-05-03 ENCOUNTER — ASOB RESULT (OUTPATIENT)
Age: 26
End: 2021-05-03

## 2021-05-03 VITALS
HEART RATE: 104 BPM | SYSTOLIC BLOOD PRESSURE: 118 MMHG | DIASTOLIC BLOOD PRESSURE: 82 MMHG | RESPIRATION RATE: 18 BRPM | TEMPERATURE: 98 F

## 2021-05-03 VITALS
HEART RATE: 88 BPM | SYSTOLIC BLOOD PRESSURE: 103 MMHG | TEMPERATURE: 98 F | RESPIRATION RATE: 18 BRPM | DIASTOLIC BLOOD PRESSURE: 66 MMHG

## 2021-05-03 VITALS — DIASTOLIC BLOOD PRESSURE: 65 MMHG | HEART RATE: 80 BPM | SYSTOLIC BLOOD PRESSURE: 110 MMHG

## 2021-05-03 DIAGNOSIS — Z3A.00 WEEKS OF GESTATION OF PREGNANCY NOT SPECIFIED: ICD-10-CM

## 2021-05-03 DIAGNOSIS — Z98.891 HISTORY OF UTERINE SCAR FROM PREVIOUS SURGERY: Chronic | ICD-10-CM

## 2021-05-03 DIAGNOSIS — O26.899 OTHER SPECIFIED PREGNANCY RELATED CONDITIONS, UNSPECIFIED TRIMESTER: ICD-10-CM

## 2021-05-03 PROBLEM — O03.9 COMPLETE OR UNSPECIFIED SPONTANEOUS ABORTION WITHOUT COMPLICATION: Chronic | Status: ACTIVE | Noted: 2019-07-08

## 2021-05-03 PROCEDURE — 99205 OFFICE O/P NEW HI 60 MIN: CPT | Mod: 25

## 2021-05-03 PROCEDURE — 76818 FETAL BIOPHYS PROFILE W/NST: CPT | Mod: 26

## 2021-05-03 NOTE — OB PROVIDER TRIAGE NOTE - HISTORY OF PRESENT ILLNESS
27 y/o f  @40 weeks presented to triage with c/o contractions every 10 mins. Pt states previous primary c/s in 2019 for CAT II tracing. Pt desires to TOLAC. Pt states AP uncomplicated. Endorses +FM. Denies any vb, lof at the moment.

## 2021-05-03 NOTE — OB PROVIDER TRIAGE NOTE - NSOBPROVIDERNOTE_OBGYN_ALL_OB_FT
Discussed with Dr. June siegel for patient to be discharged home. no evidence of active labor- maternal and fetal status re-assuring

## 2021-05-03 NOTE — OB PROVIDER TRIAGE NOTE - NS_OBGYNHISTORY_OBGYN_ALL_OB_FT
7/9/2019- Primary c/s for Cat II- term- 6#6oz   2017 SAB x 1     GYN hx: denies     AP- uncomplicated per pt

## 2021-05-04 ENCOUNTER — TRANSCRIPTION ENCOUNTER (OUTPATIENT)
Age: 26
End: 2021-05-04

## 2021-05-04 LAB
ANISOCYTOSIS BLD QL: SLIGHT — SIGNIFICANT CHANGE UP
BASOPHILS # BLD AUTO: 0 K/UL — SIGNIFICANT CHANGE UP (ref 0–0.2)
BASOPHILS NFR BLD AUTO: 0 % — SIGNIFICANT CHANGE UP (ref 0–2)
BLD GP AB SCN SERPL QL: NEGATIVE — SIGNIFICANT CHANGE UP
COVID-19 SPIKE DOMAIN AB INTERP: NEGATIVE — SIGNIFICANT CHANGE UP
COVID-19 SPIKE DOMAIN ANTIBODY RESULT: 0.4 U/ML — SIGNIFICANT CHANGE UP
EOSINOPHIL # BLD AUTO: 0 K/UL — SIGNIFICANT CHANGE UP (ref 0–0.5)
EOSINOPHIL NFR BLD AUTO: 0 % — SIGNIFICANT CHANGE UP (ref 0–6)
GIANT PLATELETS BLD QL SMEAR: PRESENT — SIGNIFICANT CHANGE UP
HCT VFR BLD CALC: 39.4 % — SIGNIFICANT CHANGE UP (ref 34.5–45)
HGB BLD-MCNC: 12.3 G/DL — SIGNIFICANT CHANGE UP (ref 11.5–15.5)
IANC: 12.89 K/UL — HIGH (ref 1.5–8.5)
LYMPHOCYTES # BLD AUTO: 1.13 K/UL — SIGNIFICANT CHANGE UP (ref 1–3.3)
LYMPHOCYTES # BLD AUTO: 7.8 % — LOW (ref 13–44)
MANUAL SMEAR VERIFICATION: SIGNIFICANT CHANGE UP
MCHC RBC-ENTMCNC: 24.6 PG — LOW (ref 27–34)
MCHC RBC-ENTMCNC: 31.2 GM/DL — LOW (ref 32–36)
MCV RBC AUTO: 78.6 FL — LOW (ref 80–100)
METAMYELOCYTES # FLD: 0.9 % — SIGNIFICANT CHANGE UP (ref 0–1)
MICROCYTES BLD QL: SLIGHT — SIGNIFICANT CHANGE UP
MONOCYTES # BLD AUTO: 0.25 K/UL — SIGNIFICANT CHANGE UP (ref 0–0.9)
MONOCYTES NFR BLD AUTO: 1.7 % — LOW (ref 2–14)
MYELOCYTES NFR BLD: 2.6 % — HIGH (ref 0–0)
NEUTROPHILS # BLD AUTO: 12.52 K/UL — HIGH (ref 1.8–7.4)
NEUTROPHILS NFR BLD AUTO: 86.1 % — HIGH (ref 43–77)
PLAT MORPH BLD: NORMAL — SIGNIFICANT CHANGE UP
PLATELET # BLD AUTO: 261 K/UL — SIGNIFICANT CHANGE UP (ref 150–400)
PLATELET COUNT - ESTIMATE: NORMAL — SIGNIFICANT CHANGE UP
POLYCHROMASIA BLD QL SMEAR: SLIGHT — SIGNIFICANT CHANGE UP
RBC # BLD: 5.01 M/UL — SIGNIFICANT CHANGE UP (ref 3.8–5.2)
RBC # FLD: 15.3 % — HIGH (ref 10.3–14.5)
RBC BLD AUTO: ABNORMAL
RH IG SCN BLD-IMP: POSITIVE — SIGNIFICANT CHANGE UP
SARS-COV-2 IGG+IGM SERPL QL IA: 0.4 U/ML — SIGNIFICANT CHANGE UP
SARS-COV-2 IGG+IGM SERPL QL IA: NEGATIVE — SIGNIFICANT CHANGE UP
SARS-COV-2 RNA SPEC QL NAA+PROBE: SIGNIFICANT CHANGE UP
T PALLIDUM AB TITR SER: NEGATIVE — SIGNIFICANT CHANGE UP
VARIANT LYMPHS # BLD: 0.9 % — SIGNIFICANT CHANGE UP (ref 0–6)
WBC # BLD: 14.54 K/UL — HIGH (ref 3.8–10.5)
WBC # FLD AUTO: 14.54 K/UL — HIGH (ref 3.8–10.5)

## 2021-05-04 RX ORDER — DIBUCAINE 1 %
1 OINTMENT (GRAM) RECTAL EVERY 6 HOURS
Refills: 0 | Status: DISCONTINUED | OUTPATIENT
Start: 2021-05-04 | End: 2021-05-05

## 2021-05-04 RX ORDER — LANOLIN
1 OINTMENT (GRAM) TOPICAL EVERY 6 HOURS
Refills: 0 | Status: DISCONTINUED | OUTPATIENT
Start: 2021-05-04 | End: 2021-05-05

## 2021-05-04 RX ORDER — MAGNESIUM HYDROXIDE 400 MG/1
30 TABLET, CHEWABLE ORAL
Refills: 0 | Status: DISCONTINUED | OUTPATIENT
Start: 2021-05-04 | End: 2021-05-05

## 2021-05-04 RX ORDER — PRAMOXINE HYDROCHLORIDE 150 MG/15G
1 AEROSOL, FOAM RECTAL EVERY 4 HOURS
Refills: 0 | Status: DISCONTINUED | OUTPATIENT
Start: 2021-05-04 | End: 2021-05-05

## 2021-05-04 RX ORDER — SIMETHICONE 80 MG/1
80 TABLET, CHEWABLE ORAL EVERY 4 HOURS
Refills: 0 | Status: DISCONTINUED | OUTPATIENT
Start: 2021-05-04 | End: 2021-05-05

## 2021-05-04 RX ORDER — KETOROLAC TROMETHAMINE 30 MG/ML
30 SYRINGE (ML) INJECTION ONCE
Refills: 0 | Status: DISCONTINUED | OUTPATIENT
Start: 2021-05-04 | End: 2021-05-04

## 2021-05-04 RX ORDER — DIPHENHYDRAMINE HCL 50 MG
25 CAPSULE ORAL EVERY 6 HOURS
Refills: 0 | Status: DISCONTINUED | OUTPATIENT
Start: 2021-05-04 | End: 2021-05-05

## 2021-05-04 RX ORDER — SODIUM CHLORIDE 9 MG/ML
1000 INJECTION, SOLUTION INTRAVENOUS ONCE
Refills: 0 | Status: DISCONTINUED | OUTPATIENT
Start: 2021-05-04 | End: 2021-05-05

## 2021-05-04 RX ORDER — IBUPROFEN 200 MG
600 TABLET ORAL EVERY 6 HOURS
Refills: 0 | Status: COMPLETED | OUTPATIENT
Start: 2021-05-04 | End: 2022-04-02

## 2021-05-04 RX ORDER — OXYCODONE HYDROCHLORIDE 5 MG/1
5 TABLET ORAL ONCE
Refills: 0 | Status: DISCONTINUED | OUTPATIENT
Start: 2021-05-04 | End: 2021-05-05

## 2021-05-04 RX ORDER — IBUPROFEN 200 MG
600 TABLET ORAL EVERY 6 HOURS
Refills: 0 | Status: DISCONTINUED | OUTPATIENT
Start: 2021-05-04 | End: 2021-05-05

## 2021-05-04 RX ORDER — OXYTOCIN 10 UNIT/ML
333.33 VIAL (ML) INJECTION
Qty: 20 | Refills: 0 | Status: DISCONTINUED | OUTPATIENT
Start: 2021-05-04 | End: 2021-05-04

## 2021-05-04 RX ORDER — AER TRAVELER 0.5 G/1
1 SOLUTION RECTAL; TOPICAL EVERY 4 HOURS
Refills: 0 | Status: DISCONTINUED | OUTPATIENT
Start: 2021-05-04 | End: 2021-05-05

## 2021-05-04 RX ORDER — BENZOCAINE 10 %
1 GEL (GRAM) MUCOUS MEMBRANE EVERY 6 HOURS
Refills: 0 | Status: DISCONTINUED | OUTPATIENT
Start: 2021-05-04 | End: 2021-05-05

## 2021-05-04 RX ORDER — OXYCODONE HYDROCHLORIDE 5 MG/1
5 TABLET ORAL
Refills: 0 | Status: DISCONTINUED | OUTPATIENT
Start: 2021-05-04 | End: 2021-05-05

## 2021-05-04 RX ORDER — AMPICILLIN TRIHYDRATE 250 MG
1 CAPSULE ORAL EVERY 4 HOURS
Refills: 0 | Status: DISCONTINUED | OUTPATIENT
Start: 2021-05-04 | End: 2021-05-04

## 2021-05-04 RX ORDER — AMPICILLIN TRIHYDRATE 250 MG
2 CAPSULE ORAL ONCE
Refills: 0 | Status: DISCONTINUED | OUTPATIENT
Start: 2021-05-04 | End: 2021-05-04

## 2021-05-04 RX ORDER — HYDROCORTISONE 1 %
1 OINTMENT (GRAM) TOPICAL EVERY 6 HOURS
Refills: 0 | Status: DISCONTINUED | OUTPATIENT
Start: 2021-05-04 | End: 2021-05-05

## 2021-05-04 RX ORDER — ACETAMINOPHEN 500 MG
975 TABLET ORAL
Refills: 0 | Status: DISCONTINUED | OUTPATIENT
Start: 2021-05-04 | End: 2021-05-05

## 2021-05-04 RX ORDER — SODIUM CHLORIDE 9 MG/ML
3 INJECTION INTRAMUSCULAR; INTRAVENOUS; SUBCUTANEOUS EVERY 8 HOURS
Refills: 0 | Status: DISCONTINUED | OUTPATIENT
Start: 2021-05-04 | End: 2021-05-05

## 2021-05-04 RX ORDER — SODIUM CHLORIDE 9 MG/ML
1000 INJECTION, SOLUTION INTRAVENOUS
Refills: 0 | Status: DISCONTINUED | OUTPATIENT
Start: 2021-05-04 | End: 2021-05-04

## 2021-05-04 RX ORDER — TETANUS TOXOID, REDUCED DIPHTHERIA TOXOID AND ACELLULAR PERTUSSIS VACCINE, ADSORBED 5; 2.5; 8; 8; 2.5 [IU]/.5ML; [IU]/.5ML; UG/.5ML; UG/.5ML; UG/.5ML
0.5 SUSPENSION INTRAMUSCULAR ONCE
Refills: 0 | Status: DISCONTINUED | OUTPATIENT
Start: 2021-05-04 | End: 2021-05-05

## 2021-05-04 RX ADMIN — SODIUM CHLORIDE 3 MILLILITER(S): 9 INJECTION INTRAMUSCULAR; INTRAVENOUS; SUBCUTANEOUS at 14:00

## 2021-05-04 RX ADMIN — Medication 1000 MILLIUNIT(S)/MIN: at 02:19

## 2021-05-04 RX ADMIN — Medication 975 MILLIGRAM(S): at 15:30

## 2021-05-04 RX ADMIN — Medication 600 MILLIGRAM(S): at 21:13

## 2021-05-04 RX ADMIN — Medication 600 MILLIGRAM(S): at 10:45

## 2021-05-04 RX ADMIN — Medication 975 MILLIGRAM(S): at 05:59

## 2021-05-04 RX ADMIN — SODIUM CHLORIDE 3 MILLILITER(S): 9 INJECTION INTRAMUSCULAR; INTRAVENOUS; SUBCUTANEOUS at 21:13

## 2021-05-04 RX ADMIN — Medication 30 MILLIGRAM(S): at 03:50

## 2021-05-04 RX ADMIN — Medication 30 MILLIGRAM(S): at 05:05

## 2021-05-04 RX ADMIN — SODIUM CHLORIDE 3 MILLILITER(S): 9 INJECTION INTRAMUSCULAR; INTRAVENOUS; SUBCUTANEOUS at 06:00

## 2021-05-04 RX ADMIN — Medication 975 MILLIGRAM(S): at 05:13

## 2021-05-04 RX ADMIN — Medication 975 MILLIGRAM(S): at 15:08

## 2021-05-04 RX ADMIN — Medication 600 MILLIGRAM(S): at 10:23

## 2021-05-04 RX ADMIN — Medication 600 MILLIGRAM(S): at 20:08

## 2021-05-04 NOTE — OB PROVIDER H&P - HISTORY OF PRESENT ILLNESS
27 yo  @ 40.1 wks c/o contractions worsening since this am, denies lof, sees pink when wipes, +GFM. AP course uncomplicated thus far. denies fever chills ha n/v new swelling vision changes cp sob or cough.     GBS: Positive from   meds:PNV  All: latex  PMH: denies  PSH: c/s x 1   gyn hx: denies  ob hx: 2019 @ 40 wks for c/s for fetal distress 7#0

## 2021-05-04 NOTE — OB PROVIDER H&P - PROBLEM SELECTOR PLAN 1
d/w Dr Tim Avila admit for TOLAC @ 40 .1 wks  epidural for pain control  Ampicillin for +GBS  prenatals reviewed  covid swab sent

## 2021-05-04 NOTE — OB PROVIDER TRIAGE NOTE - HISTORY OF PRESENT ILLNESS
25 yo  @ 40.1 wks c/o contractions worsening since this am, denies lof, sees pink when wipes, +GFM. AP course uncomplicated thus far. denies fever chills ha n/v new swelling vision changes cp sob or cough.     GBS: Positive from   meds:PNV  All: latex  PMH: denies  PSH: c/s x 1   gyn hx: denies  ob hx: 2019 @ 40 wks for c/s for fetal distress 7#0

## 2021-05-04 NOTE — DISCHARGE NOTE OB - CARE PLAN
Principal Discharge DX:	Vaginal birth after   Goal:	Recovery  Assessment and plan of treatment:	Make a follow-up appointment with your doctor in SIX WEEKS for a postpartum appointment. No heavy lifting or strenuous activity for six weeks. Nothing in the vagina (such as tampons, douches, intercourse or tub baths) until you see your doctor. You can take 1000mg of Tylenol and/or 600mg of Motrin every 6 hours for pain. Call your doctor with any signs and symptoms of infection such as fever, chills, nausea or vomiting; if you're unable to tolerate food, have an increase in vaginal bleeding, or have difficulty urinating; or if you have pain that is not relieved by medication. Notify your doctor with any other concerns including feeling depressed or "down".

## 2021-05-04 NOTE — OB PROVIDER LABOR PROGRESS NOTE - ASSESSMENT
TOLAC  - intact  - pt to receive Ampicillin  - expectant management  - pt refusing epidural at this time  - continuous monitoring    d/w Dr. Tim Avila PGY1

## 2021-05-04 NOTE — DISCHARGE NOTE OB - MEDICATION SUMMARY - MEDICATIONS TO TAKE
I will START or STAY ON the medications listed below when I get home from the hospital:    Prenatal  -- Indication: For Home Med    Motrin 600 mg oral tablet  -- 1 tab(s) by mouth every 6 hours  -- Indication: For Vaginal Delivery    Tylenol 500 mg oral tablet  -- 2 tab(s) by mouth every 6 hours  -- Indication: For Vaginal Delivery

## 2021-05-04 NOTE — OB PROVIDER DELIVERY SUMMARY - NSPROVIDERDELIVERYNOTE_OBGYN_ALL_OB_FT
Attending Note   Pt progressed to 10/100/+3   Pushed vertex over intact perineum with tight nuchal cord   Shoulders and body delivered with no issues   Delayed cord clamping performed  Placenta delivered intact  Bimanual exam reveal empty cavity and intact uterus   Vagina, rectum and cervix inspected   2nd degree laceration noted and repaired in usual fashion with intact perineum   Rectal exam intact after delivery      Girl   3025 grams   apgars 9/9     ESAU Valdes MD

## 2021-05-04 NOTE — OB PROVIDER TRIAGE NOTE - NSOBPROVIDERNOTE_OBGYN_ALL_OB_FT
27 yo  @ 40.1 wks c/o contractions worsening since this am, denies lof, sees pink when wipes, +GFM. AP course uncomplicated thus far. denies fever chills ha n/v new swelling vision changes cp sob or cough.     GBS: Positive from   meds:PNV  All: latex  PMH: denies  PSH: c/s x 1   gyn hx: denies  ob hx: 2019 @ 40 wks for c/s for fetal distress 7#    d/w Dr Tim Avila admit for TOLAC @ 40 .1 wks  epidural for pain control  Ampicillin for +GBS  prenatals reviewed  covid swab sent

## 2021-05-04 NOTE — OB PROVIDER TRIAGE NOTE - NSHPPHYSICALEXAM_GEN_ALL_CORE
LS clear bilaterally  abd soft gravid NT  Cv RRR  SVE: 5.5/80/-2 bulging membranes  TAS: vertex  FHT: moderate varaibility, + accels, negative decels  toco: q 4-5 minutes  Vital Signs Last 24 Hrs  T(C): 36.7 (03 May 2021 23:20), Max: 36.8 (03 May 2021 11:51)  T(F): 98.1 (03 May 2021 23:20), Max: 98.24 (03 May 2021 11:55)  HR: 104 (03 May 2021 23:26) (80 - 104)  BP: 118/82 (03 May 2021 23:26) (103/66 - 118/82)  BP(mean): --  RR: 18 (03 May 2021 23:20) (18 - 18)  SpO2: --

## 2021-05-04 NOTE — OB PROVIDER H&P - ATTENDING COMMENTS
Attending Note     Pt seen and examined   Pt presents with painful contractions   Pt has hx of c/section in 2019 for NRFHTs   Pt wants to TOLAC   SVE 5/80/-2   FHTs category 1 tracing  TOCO q 2 min    Will admit for TOLAC  Consents signed  amp for GBS +   EFW 2696 grams     R Keisha WEISS

## 2021-05-04 NOTE — OB PROVIDER H&P - ASSESSMENT
25 yo  @ 40.1 wks c/o contractions worsening since this am, denies lof, sees pink when wipes, +GFM. AP course uncomplicated thus far. denies fever chills ha n/v new swelling vision changes cp sob or cough.     GBS: Positive from   meds:PNV  All: latex  PMH: denies  PSH: c/s x 1   gyn hx: denies  ob hx: 2019 @ 40 wks for c/s for fetal distress 7#    d/w Dr Tim Avila admit for TOLAC @ 40 .1 wks  epidural for pain control  Ampicillin for +GBS  prenatals reviewed  covid swab sent

## 2021-05-04 NOTE — DISCHARGE NOTE OB - CARE PROVIDER_API CALL
Pati Beltran)  Obstetrics and Gynecology  [address], [address]  [place] [address]  Phone: [phone]  Fax: [phone]  Follow Up Time:

## 2021-05-04 NOTE — DISCHARGE NOTE OB - PATIENT PORTAL LINK FT
You can access the FollowMyHealth Patient Portal offered by [facility] by registering at the following website: [email or web address]. By joining [employer] [facility]’s FollowMyHealth portal, you will also be able to view your health information using other applications (apps) compatible with our system.

## 2021-05-04 NOTE — OB RN DELIVERY SUMMARY - NSSELHIDDEN_OBGYN_ALL_OB_FT
[[email or web address]_OB_FT:[email or web address]=],[NS_DeliveryAssist1_OBGYN_ALL_OB_FT:[email or web address]=],[NS_DeliveryRN_OBGYN_ALL_OB_FT:[email or web address]=]

## 2021-05-04 NOTE — OB NEONATOLOGY/PEDIATRICIAN DELIVERY SUMMARY - NSPEDSNEONOTESA_OBGYN_ALL_OB_FT
Baby girl born at 40.1wks via  to a 27y/o  A+ blood type mother. No significant maternal or prenatal history. PNL nr/immune/-, GBS + on , no ampicillin >2hrs PTD. ROM 15min before delivery with clear fluids. Baby emerged vigorous, crying, was w/d/s/s with APGARS of 9/9. Mom would like to breast and bottle feed, consents Hep B. EOS 0.08. Mother COVID status pending.

## 2021-05-04 NOTE — DISCHARGE NOTE OB - HOSPITAL COURSE
Patient was admitted in labor at term. She then had an uncomplicated vaginal birth after  followed by an uncomplicated postpartum course.    EBL: 350               12.3   14.54 )-----------( 261      ( 04 @ 01:55 )             39.4       On Postpartum day 2, patient was discharged home in stable condition, voiding spontaneously and with normal vital signs. Patient was admitted in labor at term. She then had an uncomplicated vaginal birth after  followed by an uncomplicated postpartum course.    EBL: 350               12.3   14.54 )-----------( 261      ( 04 @ 01:55 )             39.4       On Postpartum day 1, patient was discharged home in stable condition, voiding spontaneously and with normal vital signs.

## 2021-05-05 VITALS
TEMPERATURE: 99 F | SYSTOLIC BLOOD PRESSURE: 115 MMHG | OXYGEN SATURATION: 100 % | RESPIRATION RATE: 15 BRPM | HEART RATE: 90 BPM | DIASTOLIC BLOOD PRESSURE: 62 MMHG

## 2021-05-05 RX ADMIN — Medication 600 MILLIGRAM(S): at 09:04

## 2021-05-05 RX ADMIN — Medication 600 MILLIGRAM(S): at 03:22

## 2021-05-05 RX ADMIN — Medication 600 MILLIGRAM(S): at 02:22

## 2021-05-05 RX ADMIN — Medication 1 TABLET(S): at 08:34

## 2021-05-05 RX ADMIN — SODIUM CHLORIDE 3 MILLILITER(S): 9 INJECTION INTRAMUSCULAR; INTRAVENOUS; SUBCUTANEOUS at 05:40

## 2021-05-05 RX ADMIN — Medication 1 SPRAY(S): at 08:35

## 2021-05-05 RX ADMIN — Medication 600 MILLIGRAM(S): at 08:34

## 2021-05-05 NOTE — PROGRESS NOTE ADULT - ASSESSMENT
27y/o  PPD#1 from  in stable condition.     - Continue with po analgesia  - Increase ambulation  - Continue regular diet  - IV lock  - No labs  - Breast vs bottle feeding  - Bonding well with baby     Korin Perez, PGY-1 27y/o  PPD#1 from  in stable condition.     - Continue with po analgesia  - Increase ambulation  - Continue regular diet  - IV lock  - No labs  - Breast and bottle feeding  - Bonding well with baby   - Desires condoms for postpartum contraception     Korin Perez, PGY-1

## 2021-05-05 NOTE — PROGRESS NOTE ADULT - ATTENDING COMMENTS
Associate Chief of L & D (Late entry)     I have met this patient for the first time today. She was admitted and delivered by Dr Tim sanderson    OB Progress Note:  PPD#1    S: 25yo  PPD#1 s/p . Patient denies any complaints     O:  Vitals:  Vital Signs Last 24 Hrs  T(C): 36.8 (05 May 2021 05:34), Max: 37.1 (04 May 2021 18:19)  T(F): 98.3 (05 May 2021 05:34), Max: 98.8 (04 May 2021 18:19)  HR: 84 (05 May 2021 05:34) (84 - 84)  BP: 109/65 (05 May 2021 05:34) (109/65 - 117/61)  RR: 16 (05 May 2021 05:34) (15 - 17)  SpO2: 100% (05 May 2021 05:34) (99% - 100%)    MEDICATIONS  (STANDING):  acetaminophen   Tablet .. 975 milliGRAM(s) Oral <User Schedule>  diphtheria/tetanus/pertussis (acellular) Vaccine (ADAcel) 0.5 milliLiter(s) IntraMuscular once  ibuprofen  Tablet. 600 milliGRAM(s) Oral every 6 hours  lactated ringers Bolus 1000 milliLiter(s) IV Bolus once  prenatal multivitamin 1 Tablet(s) Oral daily  sodium chloride 0.9% lock flush 3 milliLiter(s) IV Push every 8 hours      Labs:  Blood type: A Positive  Rubella IgG: RPR: Negative                          12.3   14.54<H> >-----------< 261    (  @ 01:55 )             39.4      Physical Exam:    Abdomen: soft, non-tender, non-distended, fundus firm  Vaginal: Lochia wnl  Extremities: No erythema/edema    A/P: 25yo PPD#1 s/p  and repair of 2nd degree laceration  - Pain well controlled, continue current pain regimen  - Increase ambulation, SCDs when not ambulating  - Continue regular diet    Jessica Menchaca M.D., M.B.A., M.S. Associate Chief of L & D (Late entry)     I have met this patient for the first time today. She was admitted and delivered by Dr Tim sanderson    OB Progress Note:  PPD#1    S: 27yo  PPD#1 s/p . Patient denies any complaints     O:  Vitals:  Vital Signs Last 24 Hrs  T(C): 36.8 (05 May 2021 05:34), Max: 37.1 (04 May 2021 18:19)  T(F): 98.3 (05 May 2021 05:34), Max: 98.8 (04 May 2021 18:19)  HR: 84 (05 May 2021 05:34) (84 - 84)  BP: 109/65 (05 May 2021 05:34) (109/65 - 117/61)  RR: 16 (05 May 2021 05:34) (15 - 17)  SpO2: 100% (05 May 2021 05:34) (99% - 100%)    MEDICATIONS  (STANDING):  acetaminophen   Tablet .. 975 milliGRAM(s) Oral <User Schedule>  diphtheria/tetanus/pertussis (acellular) Vaccine (ADAcel) 0.5 milliLiter(s) IntraMuscular once  ibuprofen  Tablet. 600 milliGRAM(s) Oral every 6 hours  lactated ringers Bolus 1000 milliLiter(s) IV Bolus once  prenatal multivitamin 1 Tablet(s) Oral daily  sodium chloride 0.9% lock flush 3 milliLiter(s) IV Push every 8 hours      Labs:  Blood type: A Positive  Rubella IgG: RPR: Negative                          12.3   14.54<H> >-----------< 261    (  @ 01:55 )             39.4      Physical Exam:    Abdomen: soft, non-tender, non-distended, fundus firm  Vaginal: Lochia wnl  Extremities: No erythema/edema    A/P: 27yo PPD#1 s/p  and repair of 2nd degree laceration  - Patient is stable for discharge and will follow up with Dr Yomaira Menchaca M.D., M.B.A., M.S.

## 2021-06-03 NOTE — OB PROVIDER DELIVERY SUMMARY - NS_ADMITROM_OBGYN_ALL_OB
Spoke with Stephanie, doing well, no questions for me to day. no issues getting and/or taking their medications, checking action plan regularly, in their green zone.  Reminded when we will call again and to call before if there is a question.  Safia Davila, LRT, COPD Educator       Yes

## 2022-07-27 NOTE — OB PROVIDER TRIAGE NOTE - NS_FINALEDD_OBGYN_ALL_OB_DT
It was a pleasure taking care of you in our Emergency Department today. We know that when you come to [facility], you are entrusting us with your health, comfort, and safety. Our physicians and nurses honor that trust, and truly appreciate the opportunity to care for you and your loved ones. We also value your feedback. If you receive a survey about your Emergency Department experience today, please fill it out. We care about our patients' feedback, and we listen to what you have to say. Please read over your discharge instructions as these contain pertinent information to help you in the healing process. These instructions include a list of prescriptions you were given today. Follow-up information is also noted on your discharge papers. There are attached instructions and information pertaining to the reason why you were seen in the emergency department today. These discharge instructions may not be for exactly why you were here, but may be the closest available instructions that we have. These include important advice for things that you can do at home to feel better, and reasons to return to the emergency department. The evaluation and treatment you received in the emergency department is not always definitive care. If follow-up with your primary care doctor or specialist was recommended, it is important that you make these appointments for follow-up care. You may need further testing, procedures, and/or medications to help you feel better. Further tests may be required that are not available in the emergency department. Failure to make these follow-up appointments may jeopardize your health. The emergency department is here for emergent stabilization and evaluation of life and limb threatening illness and/or injuries.   Further care through a specialist or primary care doctor may be required to assist in your healing and complete your treatment and/or evaluation. We may not always be able to make a diagnosis in the emergency department, or things may change that will alter your diagnosis. Our primary goal is to ensure that nothing serious is occurring and that you are stable to continue your treatment and evaluation at home as an outpatient. Of course, if things change, and you feel worse, you are always encouraged to return to the emergency department for re-evaluation. Lab Results Today:  No results found for this or any previous visit (from the past 8 hour(s)). Radiology Results Today:  CT CHEST WO CONT    Result Date: 7/27/2022  Bilateral lower lobe atelectasis. Stable 9 mm lingular nodule with calcification. No acute abnormality. 03-May-2021

## 2023-04-17 NOTE — OB PROVIDER H&P - NS_CTXBYPALP_OBGYN_ALL_OB
Moderate Island Pedicle Flap With Canthal Suspension Text: The defect edges were debeveled with a #15 scalpel blade. Given the location of the defect, shape of the defect and the proximity to free margins an island pedicle advancement flap was deemed most appropriate. Using a sterile surgical marker, an appropriate advancement flap was drawn incorporating the defect, outlining the appropriate donor tissue and placing the expected incisions within the relaxed skin tension lines where possible. The area thus outlined was incised deep to adipose tissue with a #15 scalpel blade. The skin margins were undermined to an appropriate distance in all directions around the primary defect and laterally outward around the island pedicle utilizing iris scissors.  There was minimal undermining beneath the pedicle flap. A suspension suture was placed in the canthal tendon to prevent tension and prevent ectropion. Following this, the designed flap was placed into the primary defect and sutured into place.

## 2023-07-07 ENCOUNTER — EMERGENCY (EMERGENCY)
Facility: HOSPITAL | Age: 28
LOS: 1 days | Discharge: HOME | End: 2023-07-07
Attending: EMERGENCY MEDICINE | Admitting: EMERGENCY MEDICINE
Payer: COMMERCIAL

## 2023-07-07 VITALS
RESPIRATION RATE: 17 BRPM | TEMPERATURE: 98 F | DIASTOLIC BLOOD PRESSURE: 76 MMHG | SYSTOLIC BLOOD PRESSURE: 109 MMHG | OXYGEN SATURATION: 99 % | HEART RATE: 82 BPM

## 2023-07-07 DIAGNOSIS — Z98.891 HISTORY OF UTERINE SCAR FROM PREVIOUS SURGERY: Chronic | ICD-10-CM

## 2023-07-07 PROCEDURE — 99284 EMERGENCY DEPT VISIT MOD MDM: CPT

## 2023-07-07 PROCEDURE — 93010 ELECTROCARDIOGRAM REPORT: CPT

## 2023-07-07 NOTE — ED ADULT TRIAGE NOTE - CHIEF COMPLAINT QUOTE
pt aox4, ambulatory with steady gait comes in for chest discomfort and upper back stiffness that woke her in her sleep this morning. pt also complaining of nasal congestion and headache. pt denies fevers or sick contacts. had a cough that has since resolved. pt also notes feeling anxious since waking up because of the chest pressure. pt denies any known medical history.

## 2023-07-07 NOTE — ED ADULT TRIAGE NOTE - LANGUAGE ASSISTANCE NEEDED
pt can communicate in english/No-Patient/Caregiver offered and refused free interpretation services.

## 2023-07-08 VITALS
RESPIRATION RATE: 20 BRPM | TEMPERATURE: 98 F | SYSTOLIC BLOOD PRESSURE: 103 MMHG | OXYGEN SATURATION: 100 % | HEART RATE: 75 BPM | DIASTOLIC BLOOD PRESSURE: 71 MMHG

## 2023-07-08 PROCEDURE — 71046 X-RAY EXAM CHEST 2 VIEWS: CPT | Mod: 26

## 2023-07-08 RX ORDER — PSEUDOEPHEDRINE HCL 30 MG
30 TABLET ORAL ONCE
Refills: 0 | Status: COMPLETED | OUTPATIENT
Start: 2023-07-08 | End: 2023-07-08

## 2023-07-08 RX ORDER — ACETAMINOPHEN 500 MG
650 TABLET ORAL ONCE
Refills: 0 | Status: COMPLETED | OUTPATIENT
Start: 2023-07-08 | End: 2023-07-08

## 2023-07-08 RX ORDER — ALBUTEROL 90 UG/1
1 AEROSOL, METERED ORAL ONCE
Refills: 0 | Status: COMPLETED | OUTPATIENT
Start: 2023-07-08 | End: 2023-07-08

## 2023-07-08 RX ORDER — IPRATROPIUM/ALBUTEROL SULFATE 18-103MCG
3 AEROSOL WITH ADAPTER (GRAM) INHALATION ONCE
Refills: 0 | Status: COMPLETED | OUTPATIENT
Start: 2023-07-08 | End: 2023-07-08

## 2023-07-08 RX ORDER — PHENYLEPHRINE/DM/ACETAMINOP/GG 5-10-325MG
2 TABLET ORAL
Qty: 20 | Refills: 0
Start: 2023-07-08 | End: 2023-07-12

## 2023-07-08 RX ADMIN — Medication 30 MILLIGRAM(S): at 03:25

## 2023-07-08 RX ADMIN — ALBUTEROL 1 PUFF(S): 90 AEROSOL, METERED ORAL at 04:57

## 2023-07-08 RX ADMIN — Medication 3 MILLILITER(S): at 03:25

## 2023-07-08 RX ADMIN — Medication 650 MILLIGRAM(S): at 03:25

## 2023-07-08 NOTE — ED ADULT NURSE NOTE - OBJECTIVE STATEMENT
Pt received from triage on stretcher A/Ox4 answer all questions appropriately  C/O chest discomfort that she describes as a tightness persisting since yesterday AM. Pt denies chest pain and SOB during initial assessment, breathing is even and unlabored on room air. Endorsing headache and generalized weakness Abdomin is soft and non distended, non tender to touch  Pt ambulates independently with steady gait, moves all four extremities on command, skin is intact  Pt not in any apparent distress during initial assessment, resting comfortably at the bedside  Vital signs stable, NKA to medications  Denies PMHx  Pending provider orders

## 2023-07-08 NOTE — ED PROVIDER NOTE - MUSCULOSKELETAL, MLM
Spine appears normal, range of motion is not limited, no muscle or joint tenderness Spine appears normal, range of motion is not limited, no muscle or joint tenderness. R facial tenderness at the maxilla. Increases with stooping over.

## 2023-07-08 NOTE — ED PROVIDER NOTE - PROGRESS NOTE DETAILS
after receiving duoneb, and Mucinex patients condition is much improved. Lungs are clear to ascultation, the pressure in her head is improved and she says her chest tightness is much better. Chest xray showed no acute pulmonary disease. Plan is to discharge with Mucinex prescription and Albuterol spacer.

## 2023-07-08 NOTE — ED PROVIDER NOTE - NSFOLLOWUPINSTRUCTIONS_ED_ALL_ED_FT
It was a pleasure taking care of you. Please follow up with your PCP if any symptoms worsen. You will be prescribed Mucinex and an Albuterol Spacer. The Mucinex should help with ur sinusitis. The albuterol spacer will help relieve the chest tightness that you have been experiencing. Come back to the hospital if you have any new or worsening symptoms including chest pain, trouble breathing, dizziness or passing out.

## 2023-07-08 NOTE — ED PROVIDER NOTE - CLINICAL SUMMARY MEDICAL DECISION MAKING FREE TEXT BOX
Ms. Merlos is a 29 yo female who has had congestion in her right head for the past 3 months as well as chest tightness for the past 4-5 days. Differential consists of pneumonia, sinus infection.    plan: Ms. Merlos is a 29 yo female who has had congestion in her right head for the past 3 months as well as chest tightness for the past 4-5 days. Physical exam is notable for right zygoma tenderness and wheezes on ascultation of the lungs. She is a nonsmoker but her  does smoke. Differential consists of MI, Dissection, PE, Pneumonia, Pneumothorax, reactive airway disease, Costochondritis, Sinusitis. MI and dissection unlikely due to patients age and lack of cardiovascular risk factors and overt chest pain. Pneumonia unlikely without fevers and chills. This is most likely chronic sinusitis with reactive airway disease. Will treat with duoneb and mucinex. Chest xray to rule out pneumonia and pneumothorax.     plan:  acetaminophen   duonebs   mucinex  xray

## 2023-07-08 NOTE — ED PROVIDER NOTE - ATTENDING CONTRIBUTION TO CARE
HPI: 29 yo female who is coming in for chest tightness and anxiety for the past 4 days. She says her heart has been feeling heavy as well as numbness in her feet. She states that she has been congested in the right side of her head for about 3 months ever since she came back from [place]. She also states that she is experiencing headache, dizziness, vomiting and some weakness. She is not complaining of any double vision. She has no other medical problems and only takes carbamide peroxide for her earwax.    EXAM: Well-appearing nonacute distress tenderness to tapping of the right facial sinuses.  Left is normal.  No nasal congestion.  Heart is regular rate and rhythm lungs with generalized wheezing minimal no rhonchi or rails otherwise.  Abdomen soft nontender.    MDM: 28-year-old female with no reported past medical history from [place] here for chest tightness and right facial pain.  Most likely has chronic sinusitis as patient states that she has had this issue with her face for the last 3 months ever since COVID.  Findings on exam shows tenderness to the right maxillary sinus as well as the frontal sinus.  Patient was already given some medications by her primary care doctor and is following up with urgent care for her right cerumen impaction as well.  No facial drooping or focal deficits to suggest inflammation of her nerves.  In terms of her lungs and shortness of breath chest pressure most likely reactive airway which is previously undiagnosed but patient's  is a smoker and patient has secondhand smoke.  Will provide nebulizers Sudafed and pain medications and reassess

## 2023-07-08 NOTE — ED PROVIDER NOTE - PATIENT PORTAL LINK FT
You can access the FollowMyHealth Patient Portal offered by [facility] by registering at the following website: [email or web address]. By joining [employer]’s FollowMyHealth portal, you will also be able to view your health information using other applications (apps) compatible with our system.

## 2023-07-08 NOTE — ED PROVIDER NOTE - OBJECTIVE STATEMENT
patient is a 27 yo female who is coming in for chest tightness and anxiety for the past 4 days. She says her heart has been feeling heavy as well as numbness in her feet. She states that she has been congested in the right side of her head for about 3 months ever since she came back from [place]. She also states that she is experiencing headache, dizziness, vomiting and some weakness. She is not complaining of any double vision. She has no other medical problems and only takes carbamide peroxide for her earwax.

## 2023-08-14 ENCOUNTER — APPOINTMENT (OUTPATIENT)
Dept: NEUROLOGY | Facility: CLINIC | Age: 28
End: 2023-08-14
Payer: COMMERCIAL

## 2023-08-14 VITALS
SYSTOLIC BLOOD PRESSURE: 94 MMHG | WEIGHT: 133 LBS | DIASTOLIC BLOOD PRESSURE: 63 MMHG | BODY MASS INDEX: 25.11 KG/M2 | HEART RATE: 72 BPM | HEIGHT: 61 IN

## 2023-08-14 DIAGNOSIS — Z87.39 PERSONAL HISTORY OF OTHER DISEASES OF THE MUSCULOSKELETAL SYSTEM AND CONNECTIVE TISSUE: ICD-10-CM

## 2023-08-14 PROCEDURE — 99215 OFFICE O/P EST HI 40 MIN: CPT

## 2023-08-14 NOTE — DISCUSSION/SUMMARY
[FreeTextEntry1] : In summary, Ms. Merlos is a 28 years old woman with no PMH comes for headache, right side numbness, and muscle twitching on her legs. She reports feeling right face mildly reduced compared to the left and feeling more on the right at torso. I will do MRI brain to rule out intracranial cause, and EMG for fasciculations. She would like to try medication for headache, I will prescribed Amitriptyline low dose at night time for her.  All questions and concerns were answered.  I spent the time noted on the day of this patient encounter preparing for, providing and documenting the above E/M service and counseling and educate patient on differential, workup, disease course, and treatment/management. Education was provided to the patient during this encounter. All questions and concerns were answered and addressed in detail. The patient verbalized understanding and agreed to plan. Patient was advised to continue to monitor for neurologic symptoms and to notify my office or go to the nearest emergency room if there are any changes. Any orders/referrals and communications were provided as well. side effects of the above medications were discussed in detail including but not limited to applicable black box warning and teratogenicity as appropriate.  Patient was advised to bring previous records to my office.

## 2023-08-14 NOTE — HISTORY OF PRESENT ILLNESS
[FreeTextEntry1] : Ms. ANDREW garcía is a 28 years old woman who presented with headache.  She reports that this past July, she had fullness of her right ear. and sinus pressure on her face. She went to see ENT, and doctor took blood out from her ear. He told her there was no infection. ENT told she had TMJ.  She was prescribed Meloxicam, Flexeril, but it makes her sleepy. She hears clicking sound on her right TMJ. She also had pressure and tingling of her right side of her head. Then she developed headache at the same time. Headache is on both sides, bilateral temple areas, and back of her neck. It comes and goes. When she is tired, she has headache. She denies choking or swallowing problem.  She feels her head is heavy, and she had to lie down and rest. She felt her muscle jumping on her legs especially at night for about 4 months. Denies weakness.  She had blood work done by her PCP last month, and was told everything is fine except mildly low vitamin D. We do not have result to review.  She is from [place].  She tried Meloxicam, Advil. It helped her headache but did not make it go away.

## 2023-08-14 NOTE — PHYSICAL EXAM
[General Appearance - Alert] : alert [General Appearance - In No Acute Distress] : in no acute distress [Oriented To Time, Place, And Person] : oriented to person, place, and time [Impaired Insight] : insight and judgment were intact [Affect] : the affect was normal [Person] : oriented to person [Place] : oriented to place [Time] : oriented to time [Fluency] : fluency intact [Comprehension] : comprehension intact [Current Events] : adequate knowledge of current events [Past History] : adequate knowledge of personal past history [Cranial Nerves Optic (II)] : visual acuity intact bilaterally,  visual fields full to confrontation, pupils equal round and reactive to light [Cranial Nerves Oculomotor (III)] : extraocular motion intact [Cranial Nerves Trigeminal (V)] : facial sensation intact symmetrically [Cranial Nerves Facial (VII)] : face symmetrical [Cranial Nerves Vestibulocochlear (VIII)] : hearing was intact bilaterally [Cranial Nerves Glossopharyngeal (IX)] : tongue and palate midline [Cranial Nerves Accessory (XI - Cranial And Spinal)] : head turning and shoulder shrug symmetric [Cranial Nerves Hypoglossal (XII)] : there was no tongue deviation with protrusion [Motor Tone] : muscle tone was normal in all four extremities [Motor Strength] : muscle strength was normal in all four extremities [Involuntary Movements] : no involuntary movements were seen [No Muscle Atrophy] : normal bulk in all four extremities [Abnormal Walk] : normal gait [Balance] : balance was intact [Dysdiadochokinesia Bilaterally] : not present [Coordination - Dysmetria Impaired Finger-to-Nose Bilateral] : not present [3+] : Ankle jerk left 3+ [Plantar Reflex Right Only] : normal on the right [Plantar Reflex Left Only] : normal on the left [___] : absent on the right [___] : absent on the left [FreeTextEntry7] : subjectively reduced pinprick on right side of her face (about 1/10 less than the right side). and felt more at torso on the right side (1/10 more than the left) [PERRL With Normal Accommodation] : pupils were equal in size, round, reactive to light, with normal accommodation [Extraocular Movements] : extraocular movements were intact [Optic Disc Abnormality] : the optic disc were normal in size and color [Outer Ear] : the ears and nose were normal in appearance [Neck Appearance] : the appearance of the neck was normal [] : no respiratory distress [Heart Rate And Rhythm] : heart rate was normal and rhythm regular

## 2023-08-24 ENCOUNTER — APPOINTMENT (OUTPATIENT)
Dept: NEUROLOGY | Facility: CLINIC | Age: 28
End: 2023-08-24
Payer: COMMERCIAL

## 2023-08-24 PROCEDURE — 95910 NRV CNDJ TEST 7-8 STUDIES: CPT

## 2023-08-24 PROCEDURE — 95886 MUSC TEST DONE W/N TEST COMP: CPT

## 2023-08-31 ENCOUNTER — OUTPATIENT (OUTPATIENT)
Dept: OUTPATIENT SERVICES | Facility: HOSPITAL | Age: 28
LOS: 1 days | End: 2023-08-31
Payer: COMMERCIAL

## 2023-08-31 ENCOUNTER — APPOINTMENT (OUTPATIENT)
Dept: MRI IMAGING | Facility: CLINIC | Age: 28
End: 2023-08-31
Payer: COMMERCIAL

## 2023-08-31 DIAGNOSIS — Z98.891 HISTORY OF UTERINE SCAR FROM PREVIOUS SURGERY: Chronic | ICD-10-CM

## 2023-08-31 DIAGNOSIS — R20.0 ANESTHESIA OF SKIN: ICD-10-CM

## 2023-08-31 PROCEDURE — 70551 MRI BRAIN STEM W/O DYE: CPT

## 2023-08-31 PROCEDURE — 70551 MRI BRAIN STEM W/O DYE: CPT | Mod: 26

## 2023-09-14 ENCOUNTER — APPOINTMENT (OUTPATIENT)
Age: 28
End: 2023-09-14
Payer: COMMERCIAL

## 2023-09-14 PROCEDURE — 99203 OFFICE O/P NEW LOW 30 MIN: CPT

## 2023-09-25 ENCOUNTER — APPOINTMENT (OUTPATIENT)
Dept: NEUROLOGY | Facility: CLINIC | Age: 28
End: 2023-09-25
Payer: COMMERCIAL

## 2023-09-25 DIAGNOSIS — R20.2 ANESTHESIA OF SKIN: ICD-10-CM

## 2023-09-25 DIAGNOSIS — R20.0 ANESTHESIA OF SKIN: ICD-10-CM

## 2023-09-25 DIAGNOSIS — R25.3 FASCICULATION: ICD-10-CM

## 2023-09-25 DIAGNOSIS — G44.209 TENSION-TYPE HEADACHE, UNSPECIFIED, NOT INTRACTABLE: ICD-10-CM

## 2023-09-25 PROCEDURE — 99214 OFFICE O/P EST MOD 30 MIN: CPT

## 2023-10-06 NOTE — DISCHARGE NOTE OB - CARE PLAN
Principal Discharge DX:	 delivery delivered  Goal:	pregnancy delivered  Assessment and plan of treatment:	diet and activity as tolerated

## 2023-12-20 ENCOUNTER — APPOINTMENT (OUTPATIENT)
Dept: GASTROENTEROLOGY | Facility: CLINIC | Age: 28
End: 2023-12-20
Payer: COMMERCIAL

## 2023-12-20 VITALS
WEIGHT: 137 LBS | HEART RATE: 90 BPM | BODY MASS INDEX: 25.86 KG/M2 | HEIGHT: 61 IN | SYSTOLIC BLOOD PRESSURE: 126 MMHG | DIASTOLIC BLOOD PRESSURE: 76 MMHG

## 2023-12-20 DIAGNOSIS — L29.0 PRURITUS ANI: ICD-10-CM

## 2023-12-20 PROCEDURE — 99203 OFFICE O/P NEW LOW 30 MIN: CPT

## 2023-12-20 RX ORDER — AMITRIPTYLINE HYDROCHLORIDE 10 MG/1
10 TABLET, FILM COATED ORAL
Qty: 60 | Refills: 1 | Status: DISCONTINUED | COMMUNITY
Start: 2023-08-14 | End: 2023-12-20

## 2023-12-21 NOTE — REVIEW OF SYSTEMS
[Leg Claudication (leg pain with exertion)] : intermittent leg claudication [Constipation] : constipation [Bleeding] : bleeding [Bloating (gassiness)] : bloating [Negative] : Heme/Lymph [As Noted in HPI] : as noted in HPI

## 2023-12-22 PROBLEM — L29.0 PERIANAL PRURITUS: Status: ACTIVE | Noted: 2023-12-22

## 2023-12-22 NOTE — HISTORY OF PRESENT ILLNESS
[FreeTextEntry1] : Patricia presents to the office today for evaluation for rectal bleeding/ perianal burning pain.  For the past 2 months, the patient has seen red blood when she wipes herself or coating the stool.  It has occurred about once a week when she strains to pass hard stool.  She usually has normal stools and has not seen blood with most of her BMs.  She denies any external swollen hemorrhoids or anal pain but has felt perianal burning discomfort.  Her PCP prescribed her lidocaine for hemorrhoids which she used for 15 days.  She was told she had skin tags externally.  She has also been applying olive oil at bedtime.  The burning pain has improved and she has not seen any bleeding for the past 2-3 days.  She denies any NSAID use or family history of GI disorders.

## 2023-12-22 NOTE — ASSESSMENT
[FreeTextEntry1] : 1.  Intermittent rectal bleeding, perianal pruritus.  Suspect internal hemorrhoids.  Differential includes proctitis, ulcer, polyp.  Recs: - The patient was advised to increase fluid and dietary fiber intake.  In addition, the patient was advised to use a fiber supplement daily. - Patient was counseled on therapy for hemorrhoids, including avoiding sitting for prolonged periods of time, avoiding straining, high-fiber diet with adequate fluids to minimize constipation, Sitz baths 1-2 times a day, witch hazel pads and suppositories when symptomatic.  Samples of Calmol given today. - Follow up with female GI if symptoms persist.

## 2024-01-29 ENCOUNTER — APPOINTMENT (OUTPATIENT)
Dept: GASTROENTEROLOGY | Facility: CLINIC | Age: 29
End: 2024-01-29
Payer: COMMERCIAL

## 2024-01-29 VITALS
BODY MASS INDEX: 25.32 KG/M2 | WEIGHT: 134 LBS | DIASTOLIC BLOOD PRESSURE: 75 MMHG | HEART RATE: 86 BPM | SYSTOLIC BLOOD PRESSURE: 119 MMHG | OXYGEN SATURATION: 99 %

## 2024-01-29 DIAGNOSIS — K64.9 UNSPECIFIED HEMORRHOIDS: ICD-10-CM

## 2024-01-29 DIAGNOSIS — K62.89 OTHER SPECIFIED DISEASES OF ANUS AND RECTUM: ICD-10-CM

## 2024-01-29 DIAGNOSIS — K59.00 CONSTIPATION, UNSPECIFIED: ICD-10-CM

## 2024-01-29 DIAGNOSIS — K62.5 HEMORRHAGE OF ANUS AND RECTUM: ICD-10-CM

## 2024-01-29 PROCEDURE — 99204 OFFICE O/P NEW MOD 45 MIN: CPT

## 2024-01-29 RX ORDER — SODIUM SULFATE, POTASSIUM SULFATE AND MAGNESIUM SULFATE 1.6; 3.13; 17.5 G/177ML; G/177ML; G/177ML
17.5-3.13-1.6 SOLUTION ORAL
Qty: 1 | Refills: 0 | Status: ACTIVE | COMMUNITY
Start: 2024-01-29 | End: 1900-01-01

## 2024-01-29 NOTE — ASSESSMENT
[FreeTextEntry1] : 29F, recently seen by GI Dr Rajiv Curtis for constipation, rectal bleeding, hemorrhoids, perianal pruritis, here today to establish care.  # Hematochezia  # hemorrhoids # rectal pain  # constipation   - continue psyllium fiber supplement - tucks pads, preparation H, calmol 4 for rectal pain  - schedule cscope to r/o alternative etiologies of hematochezia. R/B discusse in detail  - prep sent to pharmacy on file  - depending on above, will refer to colorectal surgery for treatment of hemorrhoids   RTC after cscope

## 2024-01-29 NOTE — HISTORY OF PRESENT ILLNESS
[FreeTextEntry1] : 29F, recently seen by GI Dr Rajiv Curtis for constipation, rectal bleeding, hemorrhoids, perianal pruritis, here today to establish care.   Reports intermittent hematochezia, both with wiping and coating the stool  +rectal pain  Occasional constipation with straining Constipation improved after starting psyllium  Denies abd pain, n/v, wt loss   No prior cscope  No fhx of GI malignancy, IBD

## 2024-01-29 NOTE — PHYSICAL EXAM
[Abdomen Tenderness] : non-tender [Abdomen Soft] : soft [Normal] : oriented to person, place, and time [de-identified] : +ext hemorrhoids, no blood in rectal vault

## 2024-02-02 RX ORDER — POLYETHYLENE GLYCOL 3350 AND ELECTROLYTES WITH LEMON FLAVOR 236; 22.74; 6.74; 5.86; 2.97 G/4L; G/4L; G/4L; G/4L; G/4L
236 POWDER, FOR SOLUTION ORAL
Qty: 1 | Refills: 0 | Status: ACTIVE | COMMUNITY
Start: 2024-02-02 | End: 1900-01-01

## 2024-02-20 ENCOUNTER — OUTPATIENT (OUTPATIENT)
Dept: OUTPATIENT SERVICES | Facility: HOSPITAL | Age: 29
LOS: 1 days | End: 2024-02-20
Payer: COMMERCIAL

## 2024-02-20 ENCOUNTER — TRANSCRIPTION ENCOUNTER (OUTPATIENT)
Age: 29
End: 2024-02-20

## 2024-02-20 ENCOUNTER — APPOINTMENT (OUTPATIENT)
Dept: GASTROENTEROLOGY | Facility: HOSPITAL | Age: 29
End: 2024-02-20

## 2024-02-20 VITALS
DIASTOLIC BLOOD PRESSURE: 67 MMHG | WEIGHT: 134.92 LBS | TEMPERATURE: 98 F | RESPIRATION RATE: 20 BRPM | SYSTOLIC BLOOD PRESSURE: 108 MMHG | HEART RATE: 92 BPM | HEIGHT: 60 IN | OXYGEN SATURATION: 100 %

## 2024-02-20 VITALS
OXYGEN SATURATION: 99 % | HEART RATE: 65 BPM | RESPIRATION RATE: 18 BRPM | SYSTOLIC BLOOD PRESSURE: 96 MMHG | DIASTOLIC BLOOD PRESSURE: 56 MMHG

## 2024-02-20 DIAGNOSIS — Z98.891 HISTORY OF UTERINE SCAR FROM PREVIOUS SURGERY: Chronic | ICD-10-CM

## 2024-02-20 DIAGNOSIS — K62.5 HEMORRHAGE OF ANUS AND RECTUM: ICD-10-CM

## 2024-02-20 PROCEDURE — 45378 DIAGNOSTIC COLONOSCOPY: CPT

## 2024-02-20 DEVICE — NET RETRV ROT ROTH 2.5MMX230CM: Type: IMPLANTABLE DEVICE | Status: FUNCTIONAL

## 2024-02-20 RX ORDER — SODIUM CHLORIDE 9 MG/ML
500 INJECTION INTRAMUSCULAR; INTRAVENOUS; SUBCUTANEOUS
Refills: 0 | Status: DISCONTINUED | OUTPATIENT
Start: 2024-02-20 | End: 2024-03-05

## 2024-02-20 RX ORDER — ACETAMINOPHEN 500 MG
2 TABLET ORAL
Qty: 0 | Refills: 0 | DISCHARGE

## 2024-02-20 RX ORDER — IBUPROFEN 200 MG
1 TABLET ORAL
Qty: 0 | Refills: 0 | DISCHARGE

## 2024-02-20 NOTE — ASU PATIENT PROFILE, ADULT - FALL HARM RISK - UNIVERSAL INTERVENTIONS
Bed in lowest position, wheels locked, appropriate side rails in place/Call bell, personal items and telephone in reach/Instruct patient to call for assistance before getting out of bed or chair/Non-slip footwear when patient is out of bed/[place] to call system/Physically safe environment - no spills, clutter or unnecessary equipment/Purposeful Proactive Rounding/Room/bathroom lighting operational, light cord in reach

## 2024-02-20 NOTE — PRE PROCEDURE NOTE - PRE PROCEDURE EVALUATION
Attending Physician: Brian Tian                           Procedure: colonoscopy     Indication for Procedure: hematochezia, rectal pain, constipation   ________________________________________________________  PAST MEDICAL & SURGICAL HISTORY:  Spontaneous miscarriage  2017      Previous  section  2019 Category 2 6#6 (M)        ALLERGIES:  No Known Drug Allergies  latex (Urticaria)    HOME MEDICATIONS:    AICD/PPM: [ ] yes   [x ] no    PERTINENT LAB DATA:                      PHYSICAL EXAMINATION:    T(C): --  HR: --  BP: --  RR: --  SpO2: --    Constitutional: NAD  HEENT: PERRLA, EOMI,    Neck:  No JVD  Respiratory: CTAB/L  Cardiovascular: S1 and S2  Gastrointestinal: BS+, soft, NT/ND  Extremities: No peripheral edema  Neurological: A/O x 3, no focal deficits  Psychiatric: Normal mood, normal affect  Skin: No rashes    ASA Class: I [ ]  II [x ]  III [ ]  IV [ ]    COMMENTS:    The patient is a suitable candidate for the planned procedure unless box checked [ ]  No, explain:

## 2024-05-22 ENCOUNTER — APPOINTMENT (OUTPATIENT)
Dept: ORTHOPEDIC SURGERY | Facility: CLINIC | Age: 29
End: 2024-05-22
Payer: COMMERCIAL

## 2024-05-22 DIAGNOSIS — M54.9 DORSALGIA, UNSPECIFIED: ICD-10-CM

## 2024-05-22 PROCEDURE — 99204 OFFICE O/P NEW MOD 45 MIN: CPT | Mod: 25

## 2024-05-22 PROCEDURE — 72050 X-RAY EXAM NECK SPINE 4/5VWS: CPT

## 2024-05-22 RX ORDER — TIZANIDINE 2 MG/1
2 TABLET ORAL
Qty: 24 | Refills: 0 | Status: ACTIVE | COMMUNITY
Start: 2024-05-22 | End: 1900-01-01

## 2024-05-22 NOTE — ADDENDUM
[FreeTextEntry1] :  I, Senia Mccarty, acted solely as a scribe for Dr. Carlos Ruiz MD on this date 05/22/2024   All medical record entries made by the Scribe were at my, Dr. Carlos Ruiz MD., direction and personally dictated by me on 05/22/2024. I have reviewed the chart and agree that the record accurately reflects my personal performance of the history, physical exam, assessment and plan. I have also personally directed, reviewed, and agreed with the chart.

## 2024-05-22 NOTE — ASSESSMENT
[FreeTextEntry1] : I had a lengthy discussion with the patient in regard to treatment plan and diagnosis. There are no red flag findings on imaging nor are there any red flag findings on clinical exams. Therefore, we will proceed with a course of conservative treatment. This would include physical therapy/home exercise program, Tylenol, NSAIDs as medically indicated. The patient will follow up with me in approximately 5 to 6 weeks. I encouraged the patient to follow-up sooner if there are any new or worsening symptoms.

## 2024-05-22 NOTE — HISTORY OF PRESENT ILLNESS
[de-identified] : Patient is a 29 year old female who presents for initial eval of chronic pain in neck and bilateral shoulders radiating into chest for about 8 months. Reports stiffness of neck muscles, especially in the mornings. Reports radiating pain up to her head. Patient followed up with PCP to rule out cardiac issues. Denies any issues with balance, gait, dexterity, or . Details vision changes and mild dizziness. Followed up with neurology for imaging studies.

## 2024-05-22 NOTE — PHYSICAL EXAM
[de-identified] :  Cervical Physical Exam   Gait - Normal   Station - Normal   Sagittal balance - Normal   Compensatory mechanism? - None   Horizontal gaze - Maintained   Heel walk - Normal   Toe walk - Normal   Reflexes   Biceps - Normal   Triceps - Normal   Brachioradialis - Normal   Patellar - Normal   Gastroc - Normal   Clonus -No   Hoffmans - Pos on LT   Shoulder exam- normal   Spurling's - None   Wrist Pulses -2+ radial/ulnar   Foot Pulses -2+ DP/PT   Cervical range of motion - Normal   Sensation   C5-T1 sensation intact to light touch bilaterally   L1-S1 sensation intact to light touch bilaterally   Motor          Deltoid Bicep Triceps WF WE IO  Right    5/5 5/5       5/5    5/5  5/5 5/5 5/5 Left      5/5     5/5       5/5    5/5  5/5 5/5 5/5             IP Quad HS TA Gastroc EHL Right 5/5  5/5  5/5 5/5    5/5      5/5 Left   5/5  5/5  5/5 5/5    5/5      5/5   [de-identified] : Scoliosis Rads  SVA wnl No facet arthropathy

## 2024-07-08 ENCOUNTER — APPOINTMENT (OUTPATIENT)
Dept: ORTHOPEDIC SURGERY | Facility: CLINIC | Age: 29
End: 2024-07-08

## 2024-08-07 NOTE — OB RN PATIENT PROFILE - PRO RUBELLA INFANT
immune

## 2024-10-23 ENCOUNTER — APPOINTMENT (OUTPATIENT)
Dept: ORTHOPEDIC SURGERY | Facility: CLINIC | Age: 29
End: 2024-10-23
Payer: COMMERCIAL

## 2024-10-23 DIAGNOSIS — M54.9 DORSALGIA, UNSPECIFIED: ICD-10-CM

## 2024-10-23 PROCEDURE — 99214 OFFICE O/P EST MOD 30 MIN: CPT

## 2024-11-12 ENCOUNTER — APPOINTMENT (OUTPATIENT)
Dept: MRI IMAGING | Facility: IMAGING CENTER | Age: 29
End: 2024-11-12
Payer: COMMERCIAL

## 2024-11-12 ENCOUNTER — OUTPATIENT (OUTPATIENT)
Dept: OUTPATIENT SERVICES | Facility: HOSPITAL | Age: 29
LOS: 1 days | End: 2024-11-12
Payer: COMMERCIAL

## 2024-11-12 DIAGNOSIS — Z98.891 HISTORY OF UTERINE SCAR FROM PREVIOUS SURGERY: Chronic | ICD-10-CM

## 2024-11-12 DIAGNOSIS — Z00.8 ENCOUNTER FOR OTHER GENERAL EXAMINATION: ICD-10-CM

## 2024-11-12 PROCEDURE — 72148 MRI LUMBAR SPINE W/O DYE: CPT

## 2024-11-12 PROCEDURE — 72148 MRI LUMBAR SPINE W/O DYE: CPT | Mod: 26

## 2024-11-18 ENCOUNTER — APPOINTMENT (OUTPATIENT)
Dept: ORTHOPEDIC SURGERY | Facility: CLINIC | Age: 29
End: 2024-11-18
Payer: COMMERCIAL

## 2024-11-18 DIAGNOSIS — M54.9 DORSALGIA, UNSPECIFIED: ICD-10-CM

## 2024-11-18 PROCEDURE — 99213 OFFICE O/P EST LOW 20 MIN: CPT

## 2024-11-18 RX ORDER — TIZANIDINE 2 MG/1
2 TABLET ORAL EVERY 6 HOURS
Qty: 56 | Refills: 1 | Status: ACTIVE | COMMUNITY
Start: 2024-11-18 | End: 1900-01-01

## 2024-11-18 RX ORDER — MELOXICAM 15 MG/1
15 TABLET ORAL DAILY
Qty: 14 | Refills: 0 | Status: ACTIVE | COMMUNITY
Start: 2024-11-18 | End: 1900-01-01

## 2025-04-22 NOTE — PROVIDER CONTACT NOTE (OTHER) - DATE AND TIME:
Patient presents for Venofer infusion and is without complaints at this time. Patient tolerated infusion without incident. Declines AVS. Aware of next appointment on 4/29 @ 2pm.    10-Jul-2019 16:20

## (undated) DEVICE — SYR LUER LOK 50CC

## (undated) DEVICE — BIOPSY FORCEP RADIAL JAW 4 STANDARD WITH NEEDLE

## (undated) DEVICE — SOL INJ NS 0.9% 500ML 2 PORT

## (undated) DEVICE — FORCEP RADIAL JAW 4 JUMBO 2.8MM 3.2MM 240CM ORANGE DISP

## (undated) DEVICE — ELCTR GROUNDING PAD ADULT COVIDIEN

## (undated) DEVICE — TUBING SUCTION 20FT

## (undated) DEVICE — SENSOR O2 FINGER ADULT

## (undated) DEVICE — PACK IV START WITH CHG

## (undated) DEVICE — CATH IV SAFE BC 20G X 1.16" (PINK)

## (undated) DEVICE — CATH IV SAFE BC 22G X 1" (BLUE)

## (undated) DEVICE — IRRIGATOR BIO SHIELD

## (undated) DEVICE — TUBING SUCTION CONN 6FT STERILE

## (undated) DEVICE — TUBING IV SET GRAVITY 3Y 100" MACRO

## (undated) DEVICE — SUCTION YANKAUER NO CONTROL VENT

## (undated) DEVICE — FOLEY HOLDER STATLOCK 2 WAY ADULT

## (undated) DEVICE — BRUSH COLONOSCOPY CYTOLOGY

## (undated) DEVICE — POLY TRAP ETRAP

## (undated) DEVICE — CLAMP BX HOT RAD JAW 3